# Patient Record
Sex: MALE | Race: WHITE | Employment: FULL TIME | URBAN - METROPOLITAN AREA
[De-identification: names, ages, dates, MRNs, and addresses within clinical notes are randomized per-mention and may not be internally consistent; named-entity substitution may affect disease eponyms.]

---

## 2017-11-30 LAB
CHOLESTEROL, TOTAL: 192 MG/DL
CHOLESTEROL/HDL RATIO: 4.3
HDLC SERPL-MCNC: 45 MG/DL (ref 35–70)
HIV AG/AB: NORMAL
LDL CHOLESTEROL CALCULATED: 124 MG/DL (ref 0–160)
TRIGL SERPL-MCNC: 115 MG/DL
TSH SERPL DL<=0.05 MIU/L-ACNC: 1.04 UIU/ML
VLDLC SERPL CALC-MCNC: 23 MG/DL

## 2018-08-22 ENCOUNTER — OFFICE VISIT (OUTPATIENT)
Dept: FAMILY MEDICINE CLINIC | Age: 33
End: 2018-08-22

## 2018-08-22 VITALS
SYSTOLIC BLOOD PRESSURE: 123 MMHG | TEMPERATURE: 96.7 F | OXYGEN SATURATION: 100 % | RESPIRATION RATE: 12 BRPM | HEIGHT: 75 IN | HEART RATE: 46 BPM | DIASTOLIC BLOOD PRESSURE: 79 MMHG | BODY MASS INDEX: 25.29 KG/M2 | WEIGHT: 203.4 LBS

## 2018-08-22 DIAGNOSIS — R22.1 LUMP IN NECK: ICD-10-CM

## 2018-08-22 DIAGNOSIS — K62.5 BRBPR (BRIGHT RED BLOOD PER RECTUM): ICD-10-CM

## 2018-08-22 DIAGNOSIS — I73.00 RAYNAUD'S PHENOMENON WITHOUT GANGRENE: ICD-10-CM

## 2018-08-22 DIAGNOSIS — L64.9 MALE PATTERN ALOPECIA: ICD-10-CM

## 2018-08-22 DIAGNOSIS — H69.83 ETD (EUSTACHIAN TUBE DYSFUNCTION), BILATERAL: ICD-10-CM

## 2018-08-22 DIAGNOSIS — J30.2 SEASONAL ALLERGIC RHINITIS, UNSPECIFIED TRIGGER: Primary | ICD-10-CM

## 2018-08-22 DIAGNOSIS — L72.9 CYST OF SKIN: ICD-10-CM

## 2018-08-22 DIAGNOSIS — J34.2 NASAL SEPTAL DEVIATION: ICD-10-CM

## 2018-08-22 PROCEDURE — 99204 OFFICE O/P NEW MOD 45 MIN: CPT | Performed by: FAMILY MEDICINE

## 2018-08-22 RX ORDER — SULFAMETHOXAZOLE AND TRIMETHOPRIM 800; 160 MG/1; MG/1
1 TABLET ORAL 2 TIMES DAILY
Qty: 14 TABLET | Refills: 0 | Status: SHIPPED | OUTPATIENT
Start: 2018-08-22 | End: 2018-08-29

## 2018-08-22 RX ORDER — CETIRIZINE HYDROCHLORIDE 10 MG/1
10 TABLET ORAL DAILY
COMMUNITY
End: 2020-11-24

## 2018-08-22 RX ORDER — FINASTERIDE 1 MG/1
1 TABLET, FILM COATED ORAL DAILY
COMMUNITY
End: 2020-11-24

## 2018-08-22 RX ORDER — TRETINOIN 1 MG/G
CREAM TOPICAL NIGHTLY
COMMUNITY
End: 2022-01-12

## 2018-08-22 ASSESSMENT — PATIENT HEALTH QUESTIONNAIRE - PHQ9
SUM OF ALL RESPONSES TO PHQ QUESTIONS 1-9: 0
SUM OF ALL RESPONSES TO PHQ9 QUESTIONS 1 & 2: 0
SUM OF ALL RESPONSES TO PHQ QUESTIONS 1-9: 0
2. FEELING DOWN, DEPRESSED OR HOPELESS: 0
1. LITTLE INTEREST OR PLEASURE IN DOING THINGS: 0

## 2018-08-22 NOTE — PROGRESS NOTES
Here for well checkup, physical.  Pt was living in [place] for 10 years, in [facility] and enjoyed being there, but is recently moving back to [place]. Pt is going to be looking at downtown living. 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 Pt is happy to be back in [place], but is happy to be back. Pt does have some issues of chroinc allergy sx, pt states related to sx does get a chronic issues of some R ear and head discomfort. Pt did see ENT and had full evaluation including imaging. Was dx with septal deviation, allergies, and eustachian tube dysfunction. They did discuss surgery in the past.  Pt does have known allergy history. Pt does smoke cigars rarely, never cigarettes. Pt is on finasteride for hair loss, has been on chroincally for years. Pt does take the 1mg dose and tolerates well. Pt has found it to be very helpful. Pt states that he has noted a small lump under chin, present for about 5d. Pt does not have any discomfort, felt initially was an ingrown hair. Did play with it and felt some sensitivity and has calmed down. About an olive in size. No discharge    On and off for years, has had some intermittent sx of blood in bowels. Pt gets sx intermittently and does appear to be related to hemorrhoids. Sx occur every few months, last visit was 3 months ago. No nausea/vomiting. Bowels are normal, schedule is normal and does not have any hard bowels. Does do well except with travelling. Pt feels he has a circulation issue. Pt states hand and feet get cold, do feel that he ranges on the colder side. Sx have been chronic.           Except as noted in the history of present illness as above, the review of systems is negative for the following:    General ROS: negative  Psychological ROS: negative  Allergy and Immunology ROS: negative  Hematological and Lymphatic ROS: negative  Respiratory ROS: no cough, shortness of breath, or wheezing  Cardiovascular ROS: no chest pain or dyspnea on exertion  Gastrointestinal ROS: no abdominal pain, change in bowel habits, or black or bloody stools  Genito-Urinary ROS: no dysuria, trouble voiding, or hematuria  Musculoskeletal ROS: negative  Dermatological ROS: negative      Past medical, surgical, and social history reviewed and updated. Medications and allergies reviewed and updated        O: /79   Pulse (!) 46   Temp 96.7 °F (35.9 °C) (Oral)   Resp 12   Ht 6' 2.5\" (1.892 m)   Wt 203 lb 6.4 oz (92.3 kg)   SpO2 100%   BMI 25.77 kg/m²   GEN: No acute distress, cooperative, well nourished, alert. HEENT: PEERLA, EOMI , normocephalic/atraumatic, nares and oropharynx clear. Mucus membranes normal, Tympanic membranes clear bilaterally. Firm nodule under chin without tender to palpation, 1.5cm in size, mobile, no active discharge or erythema  Neck: soft, supple, no thyromegaly, mass, no Lymphadenopathy  CV: Regular rate and rhythm, no murmur, rubs, gallops. No edema. Resp: Clear to auscultation bilaterally good air entry bilaterally  No crackles, wheeze. Breathing comfortably. Abd: soft, notnender. normoactive bowels sounds.  No hepatosplenomegaly   Ext: 2+ pulses bilateral upper extremities, bilateral lower extremities        Current Outpatient Prescriptions   Medication Sig Dispense Refill    finasteride (PROPECIA) 1 MG tablet Take 1 mg by mouth daily      cetirizine (ZYRTEC ALLERGY) 10 MG tablet Take 10 mg by mouth daily      Multiple Vitamins-Minerals (MULTIVITAMIN ADULT PO) Take 1 tablet by mouth daily      Loratadine-Pseudoephedrine (ALLERGY RELIEF/NASAL DECONGEST PO) Take 1 spray by mouth daily as needed      Pseudoephedrine HCl (SUDAFED 24 HOUR PO) Take 1 tablet by mouth daily as needed      sulfamethoxazole-trimethoprim (BACTRIM DS) 800-160 MG per tablet Take 1 tablet by mouth 2 times daily for 7 days 14 tablet 0  tretinoin (RETIN-A) 0.01 % gel Apply topically nightly Apply topically nightly. No current facility-administered medications for this visit. ASSESSMENT / PLAN:    1. Seasonal allergic rhinitis, unspecified trigger  Moderate persistent sx  Cont symptomatic tx and monitor  Refer allergy for formal testing  Management pending results. - External Referral To Allergy    2. Raynaud's phenomenon without gangrene  The patient is reassured that these symptoms do not appear to represent a serious or threatening condition. Monitor with symptomatic tx  Handout given  Consider CCB therapy    3. Male pattern alopecia  Cont propecia    4. Cyst of skin  Under chin  tx with bactrim DS BID x 7d and refer ENT for eval    5. BRBPR (bright red blood per rectum)  Asymptomatic, suspect ext hemorrhoids  F/u increased sx and will consider referral to GI for eval    6. Nasal septal deviation  Refer ENT for eval  - Rina Mariscal MD    7. ETD (Eustachian tube dysfunction), bilateral  Cont symptomatic tx, monitor with f/u with ENT and allergy  - Rina Mariscal MD           Follow-up appointment:   3-4mos/prn    Discussed use, benefit, and side effects of all prescribed medications. Barriers to medication compliance addressed. All patient questions answered. Pt voiced understanding. When applicable, patient's outside records were reviewed through [facility]. The patient has signed appropriate paperworks/consents. Dragon dictation software was used for parts of this progress note. All attempts were made to correct any errors and ensure accuracy.

## 2018-09-06 ENCOUNTER — TELEPHONE (OUTPATIENT)
Dept: FAMILY MEDICINE CLINIC | Age: 33
End: 2018-09-06

## 2018-10-11 ENCOUNTER — OFFICE VISIT (OUTPATIENT)
Dept: FAMILY MEDICINE CLINIC | Age: 33
End: 2018-10-11
Payer: COMMERCIAL

## 2018-10-11 VITALS
BODY MASS INDEX: 25.21 KG/M2 | DIASTOLIC BLOOD PRESSURE: 75 MMHG | HEIGHT: 76 IN | TEMPERATURE: 97.5 F | SYSTOLIC BLOOD PRESSURE: 127 MMHG | OXYGEN SATURATION: 100 % | WEIGHT: 207 LBS | HEART RATE: 53 BPM

## 2018-10-11 DIAGNOSIS — L98.9 LEG SKIN LESION, RIGHT: ICD-10-CM

## 2018-10-11 DIAGNOSIS — Z00.00 ROUTINE GENERAL MEDICAL EXAMINATION AT A HEALTH CARE FACILITY: Primary | ICD-10-CM

## 2018-10-11 DIAGNOSIS — R53.83 FATIGUE, UNSPECIFIED TYPE: ICD-10-CM

## 2018-10-11 DIAGNOSIS — Z23 NEEDS FLU SHOT: ICD-10-CM

## 2018-10-11 DIAGNOSIS — K62.5 BRBPR (BRIGHT RED BLOOD PER RECTUM): ICD-10-CM

## 2018-10-11 DIAGNOSIS — K64.8 INTERNAL HEMORRHOID, BLEEDING: ICD-10-CM

## 2018-10-11 DIAGNOSIS — R22.1 LUMP IN NECK: ICD-10-CM

## 2018-10-11 DIAGNOSIS — J34.2 DEVIATED SEPTUM: ICD-10-CM

## 2018-10-11 PROCEDURE — 90471 IMMUNIZATION ADMIN: CPT | Performed by: FAMILY MEDICINE

## 2018-10-11 PROCEDURE — 90686 IIV4 VACC NO PRSV 0.5 ML IM: CPT | Performed by: FAMILY MEDICINE

## 2018-10-11 PROCEDURE — 99395 PREV VISIT EST AGE 18-39: CPT | Performed by: FAMILY MEDICINE

## 2018-10-11 NOTE — PROGRESS NOTES
tenderness. Oropharynx - Lips, mucosa, and tongue normal. Teeth and gums normal.   Neck - Neck supple. No adenopathy. Thyroid symmetric, normal size, no carotid bruit bilaterally. Small 2-3mm nodule under center of chin w/o mass, tender to palpation   Back - Back symmetric, no curvature. Range of motion normal. No Costovertebral angle tenderness. Lungs - Percussion normal. Good diaphragmatic excursion. Lungs clear without wheeze, rales, crackles  Heart - Regular rate and rhythm, with no rub, murmur or gallop noted. Abdomen - Abdomen soft, non-tender. Bowel sounds normal. No masses, tenderness or organomegaly  Testes are normal without masses, no hernias noted. Phallus normal.   Rectal: +L sided internal hemorrhoid w/o mass, bleeding. No discomfort. No active bleeding or discharge  Extremities - Extremities normal. No deformities, edema, or skin discoloration  Musculoskeletal - Spine ROM normal. Muscular strength intact. Peripheral pulses - radial=4/4,, femoral=4/4, popliteal=4/4, dorsalis pedis=4/4,  Neuro - Gait normal. Reflexes normal and symmetric. Sensation grossly normal.  No focal weakness  Psych - euthymic, no suicidal thoughts or ideation, mood stable. Current Outpatient Prescriptions   Medication Sig Dispense Refill    finasteride (PROPECIA) 1 MG tablet Take 1 mg by mouth daily      cetirizine (ZYRTEC ALLERGY) 10 MG tablet Take 10 mg by mouth daily      Multiple Vitamins-Minerals (MULTIVITAMIN ADULT PO) Take 1 tablet by mouth daily      Loratadine-Pseudoephedrine (ALLERGY RELIEF/NASAL DECONGEST PO) Take 1 spray by mouth daily as needed      Pseudoephedrine HCl (SUDAFED 24 HOUR PO) Take 1 tablet by mouth daily as needed      tretinoin (RETIN-A) 0.1 % cream Apply topically nightly Apply topically nightly. No current facility-administered medications for this visit. ASSESSMENT / PLAN:    1.  Routine general medical examination at a health care facility  No focal abnormalities on exam  Anticipatory guidance discussed. Check fasting bloodwork for:  - CBC; Future  - Comprehensive Metabolic Panel; Future  - Lipid Panel; Future  - TSH without Reflex; Future  - Vitamin B12 & Folate; Future  - Vitamin D 25 Hydroxy; Future  - Testosterone; Future    2. Lump in neck  Decrease in size from prior eval s/p abx  Monitor with symptomatic tx and refer ENT for eval  Discussed CT scan  Suspect benign cyst  - Mariella Bustamante DO, Otolaryngology, [facility]    3. BRBPR (bright red blood per rectum)  D/t internal hemorrhoid  Symptomatic tx discussed  Will f/u increased sx and consider GI referral  Check CBC    4. Leg skin lesion, right  Benign-apperaing  ABC's of skin lesions discussed, follow up for increase in size, change color. Has appt 3-4wks w/ derm  If increased in size can f/u here for eval and biopsy    5. Fatigue, unspecified type  Check bloodwork as above    6. Internal hemorrhoid, bleeding  As above    7. Deviated septum  Refer ENT for eval  - Eladio Morales DO, Otolaryngology, [facility]    8. Needs flu shot  Given today           Follow-up appointment:   Pending bloodwork results/prn    Discussed use, benefit, and side effects of all prescribed medications. Barriers to medication compliance addressed. All patient questions answered. Pt voiced understanding. When applicable, patient's outside records were reviewed through [facility]. The patient has signed appropriate paperworks/consents. Dragon dictation software was used for parts of this progress note. All attempts were made to correct any errors and ensure accuracy.

## 2018-11-19 ENCOUNTER — OFFICE VISIT (OUTPATIENT)
Dept: DERMATOLOGY | Age: 33
End: 2018-11-19
Payer: COMMERCIAL

## 2018-11-19 DIAGNOSIS — D48.5 NEOPLASM OF UNCERTAIN BEHAVIOR OF SKIN: ICD-10-CM

## 2018-11-19 DIAGNOSIS — L70.9 ADULT ACNE: ICD-10-CM

## 2018-11-19 DIAGNOSIS — B07.0 PLANTAR WART, RIGHT FOOT: ICD-10-CM

## 2018-11-19 DIAGNOSIS — D22.9 MULTIPLE NEVI: Primary | ICD-10-CM

## 2018-11-19 PROCEDURE — 11100 PR BIOPSY OF SKIN LESION: CPT | Performed by: DERMATOLOGY

## 2018-11-19 PROCEDURE — 99202 OFFICE O/P NEW SF 15 MIN: CPT | Performed by: DERMATOLOGY

## 2018-11-26 LAB — DERMATOLOGY PATHOLOGY REPORT: ABNORMAL

## 2018-11-27 ENCOUNTER — OFFICE VISIT (OUTPATIENT)
Dept: DERMATOLOGY | Age: 33
End: 2018-11-27
Payer: COMMERCIAL

## 2018-11-27 DIAGNOSIS — C44.712 BASAL CELL CARCINOMA (BCC) OF RIGHT LOWER LEG: Primary | ICD-10-CM

## 2018-11-27 PROCEDURE — 17262 DSTRJ MAL LES T/A/L 1.1-2.0: CPT | Performed by: DERMATOLOGY

## 2018-11-27 NOTE — PATIENT INSTRUCTIONS
Patient Education        Learning About Basal Cell Skin Cancer  Your Care Instructions    Basal cell skin cancer (carcinoma) is a type of skin cancer. It most often appears on areas of the body that have been exposed to the sun. These areas include the head, face, neck, back, chest, or shoulders. The nose is the most common site. This cancer grows slowly and does not usually spread, or metastasize, to other parts of the body. It is almost always cured when it is found early and treated. This skin cancer is usually caused by too much sun. Using tanning beds or sunlamps can also cause it. What are the symptoms? Signs of basal cell carcinoma include:  · Any firm, pearly bump with tiny blood vessels that look spidery. · Any red, tender, flat spot that bleeds easily. · Any small, fleshy bump with a smooth, pearly appearance. It may have a sunken center. · Any smooth, shiny bump that may look like a mole or cyst.  · Any patch of skin, especially on the face, that looks like a scar and is firm to the touch. · Any bump that itches, bleeds, crusts over, and then repeats the cycle and has not healed in a few weeks. · Any change in the size, shape, or color of a mole or a skin growth. How is it treated? Your doctor will want to remove all of the cancer. There are several ways to remove it. It depends on how big it is, where it is on your body, and your age and overall health. Treatment options include:  · Surgery to cut out the cancer. · Mohs micrographic surgery. This surgery removes the skin cancer one layer at a time, checking each layer for cancer cells right after it is removed. · Curettage and electrosurgery. Curettage uses a spoon-shaped instrument (curette) to scrape off the skin cancer, and electrosurgery controls the bleeding and destroys any remaining cancer cells. · Cryosurgery. Cryosurgery destroys the skin cancer by freezing it with liquid nitrogen. · Radiation therapy.  Radiation therapy uses X-rays or other types of radiation to kill cancer cells. It may be done if surgery isn't an option. Other treatment options include chemotherapy cream and photodynamic therapy. If your doctor removes the cancer, he or she will send it to a lab. The lab makes sure it is a basal cell cancer and that it all was removed. If cancer is still there, you may need more treatment. How can you prevent it? · Always wear a wide-brimmed hat and long sleeves and pants when you are outdoors. · Avoid the sun between 10 a.m. and 4 p.m., which is the peak time for UV rays. · Always wear sunscreen on exposed skin. Make sure to use a broad-spectrum sunscreen that has a sun protection factor (SPF) of 30 or higher. Use it every day, even when it is cloudy. · Do not use tanning booths or sunlamps. · Use lip balm or cream that has sun protection factor (SPF) to protect your lips from getting sunburned. · Wear sunglasses that block UV rays. When should you call for help? Watch closely for changes in your health, and be sure to contact your doctor if:  · You see a change in your skin, such as a growth or mole that:  ? Grows bigger. This may happen slowly. ? Changes color. ? Changes shape. ? Starts to bleed easily. Follow-up care is a key part of your treatment and safety. Be sure to make and go to all appointments, and call your doctor if you are having problems. It's also a good idea to know your test results and keep a list of the medicines you take. Where can you learn more? Go to [email or web address] and sign in to your [employer] account. Enter [phone] in the [facility] box to learn more about \"Learning About Basal Cell Skin Cancer. \"     If you do not have an account, please click on the \"Sign Up Now\" link. Current as of: March 28, 2018  Content Version: 11.8  © 3707-4945 Healthwise, Incorporated. Care instructions adapted under license by [facility] ([facility]).  If you have questions about a medical

## 2018-12-21 ENCOUNTER — TELEPHONE (OUTPATIENT)
Dept: DERMATOLOGY | Age: 33
End: 2018-12-21

## 2018-12-24 ENCOUNTER — TELEPHONE (OUTPATIENT)
Dept: FAMILY MEDICINE CLINIC | Age: 33
End: 2018-12-24

## 2018-12-24 DIAGNOSIS — M54.50 ACUTE BILATERAL LOW BACK PAIN WITHOUT SCIATICA: Primary | ICD-10-CM

## 2019-01-15 ENCOUNTER — TELEPHONE (OUTPATIENT)
Dept: DERMATOLOGY | Age: 34
End: 2019-01-15

## 2019-05-30 ENCOUNTER — OFFICE VISIT (OUTPATIENT)
Dept: DERMATOLOGY | Age: 34
End: 2019-05-30
Payer: COMMERCIAL

## 2019-05-30 DIAGNOSIS — B07.0 PLANTAR WART, RIGHT FOOT: Primary | ICD-10-CM

## 2019-05-30 DIAGNOSIS — L70.9 ADULT ACNE: ICD-10-CM

## 2019-05-30 DIAGNOSIS — Z85.828 HISTORY OF BASAL CELL CARCINOMA (BCC): ICD-10-CM

## 2019-05-30 DIAGNOSIS — D23.62: ICD-10-CM

## 2019-05-30 PROCEDURE — 99213 OFFICE O/P EST LOW 20 MIN: CPT | Performed by: DERMATOLOGY

## 2019-05-30 NOTE — PROGRESS NOTES
[facility] Dermat[facility]  Yovani Lutz MD  [phone]      Ronan Vazquez  1985    28 y.o. male     Date of Visit: 2019    Chief Complaint: skin lesions    History of Present Illness:    1. Follow up for plantar warts on the right foot-has few new lesions. None are painful or bothersome. Has used Tessie & Geovanna with some improvement. 2.  He also complains of a newly noted asymptomatic lesion on the left forearm. 3.  Follow-up for adult acne of the face and buttocks. Doing well with use of tretinoin 0.1% cream nightly. 4.  He has a recent history of a superficial basal cell carcinoma on the posterior aspect the right lower leg-treated with curettage on 2018. Wears sunscreen regularly. Paternal grandfather had melanoma ( from it in his 45s). Review of Systems:  Skin: No new or changing moles. Past Medical History, Family History, Surgical History, Medications and Allergies reviewed. Past Medical History:   Diagnosis Date    Allergic rhinitis      Past Surgical History:   Procedure Laterality Date    VARICOCELECTOMY         Allergies   Allergen Reactions    Amoxicillin Rash     Outpatient Medications Marked as Taking for the 19 encounter (Office Visit) with Mariana Lennon MD   Medication Sig Dispense Refill    finasteride (PROPECIA) 1 MG tablet Take 1 mg by mouth daily      cetirizine (ZYRTEC ALLERGY) 10 MG tablet Take 10 mg by mouth daily      Multiple Vitamins-Minerals (MULTIVITAMIN ADULT PO) Take 1 tablet by mouth daily      Loratadine-Pseudoephedrine (ALLERGY RELIEF/NASAL DECONGEST PO) Take 1 spray by mouth daily as needed      Pseudoephedrine HCl (SUDAFED 24 HOUR PO) Take 1 tablet by mouth daily as needed      tretinoin (RETIN-A) 0.1 % cream Apply topically nightly Apply topically nightly.            Physical Examination       The following were examined and determined to be normal: Psych/Neuro, Scalp/hair, Head/face, Conjunctivae/eyelids, Gums/teeth/lips, Neck, Breast/axilla/chest, Abdomen, Back, RUE, LUE, LLE and Nails/digits. The following were examined and determined to be abnormal: RLE and Genitalia/groin/buttocks. Well appearing. 1. Right medial forefoot - 3 small round verrucous pink papules. 2.  Left forearm - small round indurated pink tan papule. 3.  Buttocks with few erythematous pustules. Face clear. 4.  Right lower posterior leg - oval shaped pink brown scar. Assessment and Plan     1. Plantar warts, right foot - small and asympatomatic    WartStick daily until improved. 2. Dermatofibroma of left forearm     Reassurance. 3. Adult acne - under good control    Continue use of tretinoin 0.1% cream.     4. History of basal cell carcinoma (BCC) - clear    Sun protective behaviors and self skin examinations were encouraged. Call for any new or concerning lesions. Return in about 6 months (around 11/30/2019).

## 2019-11-07 ENCOUNTER — TELEPHONE (OUTPATIENT)
Dept: DERMATOLOGY | Age: 34
End: 2019-11-07

## 2019-12-09 ENCOUNTER — TELEPHONE (OUTPATIENT)
Dept: DERMATOLOGY | Age: 34
End: 2019-12-09

## 2020-01-28 ENCOUNTER — TELEPHONE (OUTPATIENT)
Dept: DERMATOLOGY | Age: 35
End: 2020-01-28

## 2020-02-18 ENCOUNTER — OFFICE VISIT (OUTPATIENT)
Dept: DERMATOLOGY | Age: 35
End: 2020-02-18
Payer: COMMERCIAL

## 2020-02-18 PROCEDURE — 99213 OFFICE O/P EST LOW 20 MIN: CPT | Performed by: DERMATOLOGY

## 2020-02-18 PROCEDURE — 17110 DESTRUCTION B9 LES UP TO 14: CPT | Performed by: DERMATOLOGY

## 2020-02-18 RX ORDER — ADAPALENE AND BENZOYL PEROXIDE 3; 25 MG/G; MG/G
GEL TOPICAL
Qty: 45 G | Refills: 3 | Status: SHIPPED | OUTPATIENT
Start: 2020-02-18 | End: 2020-08-04 | Stop reason: SDUPTHER

## 2020-02-18 RX ORDER — TRIAMCINOLONE ACETONIDE 1 MG/G
CREAM TOPICAL
Qty: 80 G | Refills: 1 | Status: SHIPPED | OUTPATIENT
Start: 2020-02-18 | End: 2022-01-12

## 2020-02-18 NOTE — TELEPHONE ENCOUNTER
Patient returned call and stating there should have been paperwork sent for a prior auth for his medicarion,    He can be reached at [phone].   Thanks

## 2020-02-27 ENCOUNTER — TELEPHONE (OUTPATIENT)
Dept: DERMATOLOGY | Age: 35
End: 2020-02-27

## 2020-02-27 NOTE — TELEPHONE ENCOUNTER
Pt lvm c/b #[phone]  Pt stated  Pt stated clarke called 2 scripts in for him he was able to fill one script but he wants to speak with someone to discuss results about the price of the script.

## 2020-02-28 NOTE — TELEPHONE ENCOUNTER
Dr. Gina Salomon patient    Patient called back and Adapalene-Benzoyl Peroxide (EPIDUO FORTE) 0.3-2.5 % GEL is still expensive at $300. An alternative will be ok but would like to discuss.     Call back # [phone]

## 2020-02-28 NOTE — TELEPHONE ENCOUNTER
Called and left message for patient to call back office. Please ask patient if it was the [employer] that was expensive. If he would like Dr Crescencio Ocampo can send in a cheaper alternative.

## 2020-03-02 NOTE — TELEPHONE ENCOUNTER
Faxed Silvano coupon to [employer] which brought price down to $75 (called to confirm it was recieved). Left message informing patient of this.

## 2020-06-30 ENCOUNTER — OFFICE VISIT (OUTPATIENT)
Dept: PRIMARY CARE CLINIC | Age: 35
End: 2020-06-30

## 2020-07-02 LAB
SARS-COV-2: NOT DETECTED
SOURCE: NORMAL

## 2020-07-14 ENCOUNTER — OFFICE VISIT (OUTPATIENT)
Dept: PRIMARY CARE CLINIC | Age: 35
End: 2020-07-14
Payer: COMMERCIAL

## 2020-07-14 PROCEDURE — G8428 CUR MEDS NOT DOCUMENT: HCPCS | Performed by: NURSE PRACTITIONER

## 2020-07-14 PROCEDURE — 99211 OFF/OP EST MAY X REQ PHY/QHP: CPT | Performed by: NURSE PRACTITIONER

## 2020-07-14 PROCEDURE — G8421 BMI NOT CALCULATED: HCPCS | Performed by: NURSE PRACTITIONER

## 2020-07-14 NOTE — PROGRESS NOTES
Macho Burton received a viral test for COVID-19. They were educated on isolation and quarantine as appropriate. For any symptoms, they were directed to seek care from their PCP, given contact information to establish with a doctor, directed to an urgent care or the emergency room.

## 2020-07-19 LAB
SARS-COV-2: NOT DETECTED
SOURCE: NORMAL

## 2020-08-04 ENCOUNTER — OFFICE VISIT (OUTPATIENT)
Dept: DERMATOLOGY | Age: 35
End: 2020-08-04
Payer: COMMERCIAL

## 2020-08-04 VITALS — TEMPERATURE: 97.5 F

## 2020-08-04 PROCEDURE — G8427 DOCREV CUR MEDS BY ELIG CLIN: HCPCS | Performed by: DERMATOLOGY

## 2020-08-04 PROCEDURE — 4004F PT TOBACCO SCREEN RCVD TLK: CPT | Performed by: DERMATOLOGY

## 2020-08-04 PROCEDURE — G8421 BMI NOT CALCULATED: HCPCS | Performed by: DERMATOLOGY

## 2020-08-04 PROCEDURE — 99213 OFFICE O/P EST LOW 20 MIN: CPT | Performed by: DERMATOLOGY

## 2020-08-04 RX ORDER — ADAPALENE AND BENZOYL PEROXIDE 3; 25 MG/G; MG/G
GEL TOPICAL
Qty: 45 G | Refills: 3 | Status: SHIPPED | OUTPATIENT
Start: 2020-08-04 | End: 2021-07-21 | Stop reason: SDUPTHER

## 2020-08-04 NOTE — PROGRESS NOTES
[facility]at[facility]  Chel Mahmood  1985    27 y.o. male     Date of Visit: 2020    Chief Complaint: skin moles    History of Present Illness:    1. He presents today for evaluation of multiple nevi on the trunk and extremities. Not aware of any changes in size, color or shape. 2.  He has a history of a superficial basal cell carcinoma on the posterior aspect the right lower leg-treated with curettage on 2018. Denies any signs of recurrence. 3.  F/u for adult acne - still comes and goes but improves with Epiduo Forte gel. Wears sunscreen regularly. Paternal grandfather had melanoma ( from it in his 45s).        Review of Systems:  Skin: no rash. Past Medical History, Family History, Surgical History, Medications and Allergies reviewed. Past Medical History:   Diagnosis Date    Allergic rhinitis      Past Surgical History:   Procedure Laterality Date    VARICOCELECTOMY         Allergies   Allergen Reactions    Amoxicillin Rash     Outpatient Medications Marked as Taking for the 20 encounter (Office Visit) with Tere Campbell MD   Medication Sig Dispense Refill    Adapalene-Benzoyl Peroxide (EPIDUO FORTE) 0.3-2.5 % GEL Apply to the face daily for acne. 45 g 3    triamcinolone (KENALOG) 0.1 % cream Apply to affected area twice daily for up to 2 weeks or until improved. 80 g 1    finasteride (PROPECIA) 1 MG tablet Take 1 mg by mouth daily      cetirizine (ZYRTEC ALLERGY) 10 MG tablet Take 10 mg by mouth daily      Multiple Vitamins-Minerals (MULTIVITAMIN ADULT PO) Take 1 tablet by mouth daily      Loratadine-Pseudoephedrine (ALLERGY RELIEF/NASAL DECONGEST PO) Take 1 spray by mouth daily as needed      Pseudoephedrine HCl (SUDAFED 24 HOUR PO) Take 1 tablet by mouth daily as needed      tretinoin (RETIN-A) 0.1 % cream Apply topically nightly Apply topically nightly.            Physical Examination       The following were examined and determined to be normal: Psych/Neuro, Scalp/hair, Head/face, Conjunctivae/eyelids, Gums/teeth/lips, Neck, Breast/axilla/chest, Abdomen, Back, RUE, LUE, RLE, LLE and Nails/digits. The following were examined and determined to be abnormal: None. Well appearing. 1.  Trunk and extremities with multiple well defined round to oval smooth brown macules and papules. 2.  Right posterior lower leg - hypopigmented smooth scar. 3.  Clear. Assessment and Plan     1. Multiple nevi - benign appearing    Sun protective behaviors and self skin examinations were encouraged. Call for any new or concerning lesions. 2. History of basal cell carcinoma (BCC) - clear    Sun protective behaviors and self skin examinations were encouraged. Call for any new or concerning lesions. 3. Adult acne - under good control    Continue Epiduo Forte gel daily as needed. Return in about 6 months (around 2/4/2021).

## 2020-11-11 ENCOUNTER — TELEPHONE (OUTPATIENT)
Dept: FAMILY MEDICINE CLINIC | Age: 35
End: 2020-11-11

## 2020-11-11 DIAGNOSIS — Z00.00 ROUTINE GENERAL MEDICAL EXAMINATION AT A HEALTH CARE FACILITY: Primary | ICD-10-CM

## 2020-11-12 ENCOUNTER — TELEPHONE (OUTPATIENT)
Dept: FAMILY MEDICINE CLINIC | Age: 35
End: 2020-11-12

## 2020-11-12 NOTE — TELEPHONE ENCOUNTER
Patient would also like lab orders for blood type and COVID antibody testing. Patient questioning why last time (2018) he was also tested for Vitamin D25, Vitamin B12 and testosterone but not this time.    Please call him at # [phone]

## 2020-11-24 ENCOUNTER — OFFICE VISIT (OUTPATIENT)
Dept: FAMILY MEDICINE CLINIC | Age: 35
End: 2020-11-24
Payer: COMMERCIAL

## 2020-11-24 ENCOUNTER — PATIENT MESSAGE (OUTPATIENT)
Dept: FAMILY MEDICINE CLINIC | Age: 35
End: 2020-11-24

## 2020-11-24 PROCEDURE — G8482 FLU IMMUNIZE ORDER/ADMIN: HCPCS | Performed by: FAMILY MEDICINE

## 2020-11-24 PROCEDURE — 99395 PREV VISIT EST AGE 18-39: CPT | Performed by: FAMILY MEDICINE

## 2020-11-24 ASSESSMENT — PATIENT HEALTH QUESTIONNAIRE - PHQ9
1. LITTLE INTEREST OR PLEASURE IN DOING THINGS: 0
SUM OF ALL RESPONSES TO PHQ9 QUESTIONS 1 & 2: 0
SUM OF ALL RESPONSES TO PHQ QUESTIONS 1-9: 0
2. FEELING DOWN, DEPRESSED OR HOPELESS: 0

## 2020-11-24 NOTE — PROGRESS NOTES
distress  Skin - Skin color, texture, turgor normal. No rashes or lesions. No suspicious findings  Head - Normocephalic. No masses, lesions, tenderness or abnormalities  Eyes - conjunctivae/corneas clear. Pupils equal and reactive to light and accomodation, extraocular muscles intact. Ears - External ears normal. Canals clear. Tympanic membranes normal bilaterally. Nose/Sinuses - Nares normal. Septum midline. Mucosa normal. No drainage or sinus tenderness. Oropharynx - Lips, mucosa, and tongue normal. Teeth and gums normal.   Neck - Neck supple. No adenopathy. Thyroid symmetric, normal size, no carotid bruit bilaterally  Back - Back symmetric, no curvature. Range of motion normal. No Costovertebral angle tenderness. Lungs - Percussion normal. Good diaphragmatic excursion. Lungs clear without wheeze, rales, crackles  Heart - Regular rate and rhythm, with no rub, murmur or gallop noted. Abdomen - Abdomen soft, non-tender. Bowel sounds normal. No masses, tenderness or organomegaly  Extremities - Extremities normal. No deformities, edema, or skin discoloration  Musculoskeletal - Spine ROM normal. Muscular strength intact. Peripheral pulses - radial=4/4,, femoral=4/4, popliteal=4/4, dorsalis pedis=4/4,  Neuro - Gait normal. Reflexes normal and symmetric. Sensation grossly normal.  No focal weakness  Psych - euthymic, no suicidal thoughts or ideation, mood stable. Current Outpatient Medications   Medication Sig Dispense Refill    Adapalene-Benzoyl Peroxide (EPIDUO FORTE) 0.3-2.5 % GEL Apply to the face daily for acne.  45 g 3    finasteride (PROPECIA) 1 MG tablet Take 1 mg by mouth daily      cetirizine (ZYRTEC ALLERGY) 10 MG tablet Take 10 mg by mouth daily      Multiple Vitamins-Minerals (MULTIVITAMIN ADULT PO) Take 1 tablet by mouth daily      Loratadine-Pseudoephedrine (ALLERGY RELIEF/NASAL DECONGEST PO) Take 1 spray by mouth daily as needed      Pseudoephedrine HCl (SUDAFED 24 HOUR PO) Take 1 tablet by mouth daily as needed      tretinoin (RETIN-A) 0.1 % cream Apply topically nightly Apply topically nightly.  triamcinolone (KENALOG) 0.1 % cream Apply to affected area twice daily for up to 2 weeks or until improved. (Patient not taking: Reported on 11/24/2020) 80 g 1     No current facility-administered medications for this visit. Immunization History   Administered Date(s) Administered    Influenza, MDCK Quadv, IM, PF (Flucelvax 4 yrs and older) 10/31/2019, 10/06/2020    Influenza, Quadv, IM, PF (6 mo and older Fluzone, Flulaval, Fluarix, and 3 yrs and older Afluria) 10/11/2018    Tdap (Boostrix, Adacel) 01/10/2017         ASSESSMENT / PLAN:    1. Routine general medical examination at a health care facility  No focal abnormalities on exam  Anticipatory guidance discussed. Orders in system for bloodwork     2. COVID-19 virus infection  Resolved  Currently asymptomatic  Check covid ab  monitor    3. All rhinitis  Cont symptomatic/supportive therapy and will monitor  Off meds at this time  Cont zyrtec prn  Consider allergist referral    4. chronic wrist pain  Refer to dr. Ellis Segura for eval    5. Deviated septum  Cont supportive therapy, and consider ENT for increased sx severity  Consider allergist referral  Follow-up appointment:   Pending bloodwork results/1 year/prn        Discussed use, benefit, and side effects of all prescribed medications. Barriers to medication compliance addressed. All patient questions answered. Pt voiced understanding. When applicable, patient's outside records were reviewed through [facility]. The patient has signed appropriate paperworks/consents.

## 2020-11-24 NOTE — TELEPHONE ENCOUNTER
From: Corinne Riser  To: Any Diane MD  Sent: 11/24/2020 11:52 AM EST  Subject: Visit Danielle Patel,  Thanks again for today's visit. Here's the fertility doc background I promised:  - Intro appointment Tues 12/8 with Dr. Allegra Miles at [facility] for [address]. Also. ..  - Where can I access the results from today's customary tests (e.g., blood pressure, oxygen, etc.)? I wasn't provided the usual paper recap upon leaving.   Sincerely,  Parviz Herrera

## 2020-11-25 NOTE — TELEPHONE ENCOUNTER
Patient's message     I'd also like to revise the existing \"order\" for my bloodwork to exclude Antibody testing since my insurance likely doesn't cover. Please confirm as soon as you're able. I'll hold off on going over to the lab until then.

## 2020-11-30 VITALS
TEMPERATURE: 97.2 F | HEART RATE: 62 BPM | BODY MASS INDEX: 26.06 KG/M2 | WEIGHT: 214 LBS | DIASTOLIC BLOOD PRESSURE: 84 MMHG | OXYGEN SATURATION: 99 % | HEIGHT: 76 IN | SYSTOLIC BLOOD PRESSURE: 118 MMHG

## 2020-11-30 NOTE — TELEPHONE ENCOUNTER
Thanks Fern. Can you please update my \"weight\" as recorded during last week's visit? The ~234 lbs is an error. Instead I weighed in around ~214 lbs with clothes/shoes on. Please confirm to ensure accuracy.       updated

## 2020-12-28 ENCOUNTER — PATIENT MESSAGE (OUTPATIENT)
Dept: FAMILY MEDICINE CLINIC | Age: 35
End: 2020-12-28

## 2021-01-25 NOTE — TELEPHONE ENCOUNTER
Left voicemail for Kate Clark and sent [employer] message to inform him that Dr. Gonzalo Olmos has agreed to see his wife as a new patient.
Ok to check with dr. Tim Wilburn to see if they will accept his wife
Please advise    Pt f/u on message
Please advise on # 2    Dr. Greg Holm,  Following up on two items from my last visit:  1) I've decided not to proceed with the optional bloodwork this year. Instead let's plan to do this ahead of my next visit. Is there anything I need to do to cancel your \"order\" in the meantime? 2) Can you please check with your colleagues (Dr. Cleo Emerson or the other female doc you mentioned) about onboarding my fiancé as discussed? She recently moved to [place] from [place] and we'd really appreciate the opportunity to ensure trusted primary care under one roof.    Thanks, Tomy Singleton
That is fine to schedule her as a new patient.
Number Of Freeze-Thaw Cycles: 2 freeze-thaw cycles
Render Post-Care Instructions In Note?: no
Post-Care Instructions: I reviewed with the patient in detail post-care instructions. Patient is to wear sunprotection, and avoid picking at any of the treated lesions. Pt may apply Vaseline to crusted or scabbing areas.
Detail Level: Detailed
Duration Of Freeze Thaw-Cycle (Seconds): 5
Consent: The patient's consent was obtained including but not limited to risks of crusting, scabbing, blistering, scarring, darker or lighter pigmentary change, recurrence, incomplete removal and infection.
Duration Of Freeze Thaw-Cycle (Seconds): 5-10
Medical Necessity Information: It is in your best interest to select a reason for this procedure from the list below. All of these items fulfill various CMS LCD requirements except the new and changing color options.
Post-Care Instructions: No charge per MM
Medical Necessity Clause: This procedure was medically necessary because the lesions that were treated were: irritated

## 2021-03-22 ENCOUNTER — PATIENT MESSAGE (OUTPATIENT)
Dept: DERMATOLOGY | Age: 36
End: 2021-03-22

## 2021-03-22 NOTE — TELEPHONE ENCOUNTER
From: Bertha Scott  To: Jovanny Grigsby MD  Sent: 3/22/2021 3:07 PM EDT  Subject: Visit Follow-Up Question    I'm moving and have a change of address. Please update my file:     [address], [address]    Thanks!   Ginny Gaytan

## 2021-04-30 ENCOUNTER — TELEPHONE (OUTPATIENT)
Dept: FAMILY MEDICINE CLINIC | Age: 36
End: 2021-04-30

## 2021-04-30 NOTE — TELEPHONE ENCOUNTER
----- Message from [facility] sent at 4/30/2021  3:32 PM EDT -----  Subject: Message to Provider    QUESTIONS  Information for Provider? patient would like to speak to someone in the   office. he has basic questions about his health plan. call patient back. ---------------------------------------------------------------------------  --------------  [place] INFO  What is the best way for the office to contact you? OK to leave message on   voicemail  Preferred Call Back Phone Number? [phone]  ---------------------------------------------------------------------------  --------------  SCRIPT ANSWERS  Relationship to Patient?  Self

## 2021-07-21 RX ORDER — ADAPALENE AND BENZOYL PEROXIDE 3; 25 MG/G; MG/G
GEL TOPICAL
Qty: 45 G | Refills: 1 | Status: SHIPPED | OUTPATIENT
Start: 2021-07-21 | End: 2021-11-24

## 2021-07-21 NOTE — TELEPHONE ENCOUNTER
Dr Gayle Chopra patient  Pt c/b #[phone]  Pt stated  Due to having to cancel his appts multiple times due to insurance purposes pt wanted to know can he have a refill on his Adapalene-Benzoyl Peroxide (EPIDUO FORTE) 0.3-2.5 % GEL   Preferred pharmacy [place] [address], [address] [facility] [phone] - F [phone]

## 2021-11-24 RX ORDER — ADAPALENE AND BENZOYL PEROXIDE 3; 25 MG/G; MG/G
GEL TOPICAL
Qty: 45 G | Refills: 0 | Status: SHIPPED | OUTPATIENT
Start: 2021-11-24 | End: 2022-01-12 | Stop reason: SDUPTHER

## 2022-01-12 ENCOUNTER — OFFICE VISIT (OUTPATIENT)
Dept: DERMATOLOGY | Age: 37
End: 2022-01-12

## 2022-01-12 VITALS — TEMPERATURE: 97.2 F

## 2022-01-12 DIAGNOSIS — D22.9 MULTIPLE NEVI: Primary | ICD-10-CM

## 2022-01-12 PROCEDURE — 99212 OFFICE O/P EST SF 10 MIN: CPT | Performed by: DERMATOLOGY

## 2022-01-12 RX ORDER — ADAPALENE AND BENZOYL PEROXIDE 3; 25 MG/G; MG/G
GEL TOPICAL
Qty: 45 G | Refills: 3 | Status: SHIPPED | OUTPATIENT
Start: 2022-01-12

## 2022-04-15 ENCOUNTER — TELEPHONE (OUTPATIENT)
Dept: FAMILY MEDICINE CLINIC | Age: 37
End: 2022-04-15

## 2022-04-15 DIAGNOSIS — Z00.00 ROUTINE GENERAL MEDICAL EXAMINATION AT A HEALTH CARE FACILITY: Primary | ICD-10-CM

## 2022-04-15 NOTE — TELEPHONE ENCOUNTER
Patient would like blood work ordered before his next scheduled physical. He would like the same order that was place on his last visit in October 2018, however he would like his blood type and his A1C added to the order. He would like to be notified on his mychart when the order has been placed.

## 2022-04-25 ENCOUNTER — OFFICE VISIT (OUTPATIENT)
Dept: FAMILY MEDICINE CLINIC | Age: 37
End: 2022-04-25
Payer: COMMERCIAL

## 2022-04-25 VITALS
WEIGHT: 215.2 LBS | RESPIRATION RATE: 14 BRPM | HEART RATE: 60 BPM | DIASTOLIC BLOOD PRESSURE: 80 MMHG | SYSTOLIC BLOOD PRESSURE: 122 MMHG | TEMPERATURE: 97.9 F | HEIGHT: 76 IN | BODY MASS INDEX: 26.2 KG/M2 | OXYGEN SATURATION: 100 %

## 2022-04-25 DIAGNOSIS — Z00.00 ROUTINE GENERAL MEDICAL EXAMINATION AT A HEALTH CARE FACILITY: Primary | ICD-10-CM

## 2022-04-25 DIAGNOSIS — M25.50 ARTHRALGIA OF MULTIPLE JOINTS: ICD-10-CM

## 2022-04-25 DIAGNOSIS — M25.532 CHRONIC PAIN OF BOTH WRISTS: ICD-10-CM

## 2022-04-25 DIAGNOSIS — H69.83 ETD (EUSTACHIAN TUBE DYSFUNCTION), BILATERAL: ICD-10-CM

## 2022-04-25 DIAGNOSIS — M25.531 CHRONIC PAIN OF BOTH WRISTS: ICD-10-CM

## 2022-04-25 DIAGNOSIS — J30.2 SEASONAL ALLERGIC RHINITIS, UNSPECIFIED TRIGGER: ICD-10-CM

## 2022-04-25 DIAGNOSIS — J34.2 DEVIATED SEPTUM: ICD-10-CM

## 2022-04-25 DIAGNOSIS — K62.5 BRBPR (BRIGHT RED BLOOD PER RECTUM): ICD-10-CM

## 2022-04-25 DIAGNOSIS — R53.83 FATIGUE, UNSPECIFIED TYPE: ICD-10-CM

## 2022-04-25 DIAGNOSIS — G89.29 CHRONIC PAIN OF BOTH WRISTS: ICD-10-CM

## 2022-04-25 PROCEDURE — 99395 PREV VISIT EST AGE 18-39: CPT | Performed by: FAMILY MEDICINE

## 2022-04-25 SDOH — ECONOMIC STABILITY: FOOD INSECURITY: WITHIN THE PAST 12 MONTHS, YOU WORRIED THAT YOUR FOOD WOULD RUN OUT BEFORE YOU GOT MONEY TO BUY MORE.: NEVER TRUE

## 2022-04-25 SDOH — ECONOMIC STABILITY: FOOD INSECURITY: WITHIN THE PAST 12 MONTHS, THE FOOD YOU BOUGHT JUST DIDN'T LAST AND YOU DIDN'T HAVE MONEY TO GET MORE.: NEVER TRUE

## 2022-04-25 SDOH — ECONOMIC STABILITY: TRANSPORTATION INSECURITY
IN THE PAST 12 MONTHS, HAS THE LACK OF TRANSPORTATION KEPT YOU FROM MEDICAL APPOINTMENTS OR FROM GETTING MEDICATIONS?: NO

## 2022-04-25 SDOH — ECONOMIC STABILITY: TRANSPORTATION INSECURITY
IN THE PAST 12 MONTHS, HAS LACK OF TRANSPORTATION KEPT YOU FROM MEETINGS, WORK, OR FROM GETTING THINGS NEEDED FOR DAILY LIVING?: NO

## 2022-04-25 ASSESSMENT — PATIENT HEALTH QUESTIONNAIRE - PHQ9
SUM OF ALL RESPONSES TO PHQ QUESTIONS 1-9: 0
SUM OF ALL RESPONSES TO PHQ9 QUESTIONS 1 & 2: 0
2. FEELING DOWN, DEPRESSED OR HOPELESS: 0
1. LITTLE INTEREST OR PLEASURE IN DOING THINGS: 0
SUM OF ALL RESPONSES TO PHQ QUESTIONS 1-9: 0

## 2022-04-25 ASSESSMENT — SOCIAL DETERMINANTS OF HEALTH (SDOH): HOW HARD IS IT FOR YOU TO PAY FOR THE VERY BASICS LIKE FOOD, HOUSING, MEDICAL CARE, AND HEATING?: NOT HARD AT ALL

## 2022-04-25 NOTE — PROGRESS NOTES
Here for well checkup, physical.  Pt states that he is feeling well overall, staying healthy. Pt that he recently got  a few months ago and that is doing well. 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 Pt does feel that he is doing well to manage the stress of COVID, has done well since transitioning back to [place] from living in [facility]. Pt does have some mild anxiety but is managing. Pt has done great with exercise, uses [employer] regularly and starting to run. No issues of abd pain, bowel changes. Pt does have a significantly deviated septum and recurrent issues of ETD, with pressure/irritation in R ear. Pt has had chronically but would like to get evaluated    Pt feels energy level seems to be doing well overall, denies any issues of chest pain, shortness of breath. Except as noted in the history of present illness as above, the review of systems is negative for the following:    General ROS: negative  Psychological ROS: negative  Allergy and Immunology ROS: negative  Hematological and Lymphatic ROS: negative  Respiratory ROS: no cough, shortness of breath, or wheezing  Cardiovascular ROS: no chest pain or dyspnea on exertion  Gastrointestinal ROS: no abdominal pain, change in bowel habits, or black or bloody stools  Genito-Urinary ROS: no dysuria, trouble voiding, or hematuria  Musculoskeletal ROS: negative  Dermatological ROS: negative      Past medical, surgical, and social history reviewed and updated. Medications and allergies reviewed and updated        /80   Pulse 60   Temp 97.9 °F (36.6 °C) (Temporal)   Resp 14   Ht 6' 4\" (1.93 m)   Wt 215 lb 3.2 oz (97.6 kg)   SpO2 100%   BMI 26.19 kg/m²   General appearance - healthy, alert, no distress  Skin - Skin color, texture, turgor normal. No rashes or lesions. No suspicious findings  Head - Normocephalic.  No masses, lesions, tenderness or abnormalities  Eyes - conjunctivae/corneas clear. Pupils equal and reactive to light and accomodation, extraocular muscles intact. Ears - External ears normal. Canals clear. Tympanic membranes normal bilaterally. Nose/Sinuses - Nares normal. Septum midline. Mucosa normal. No drainage or sinus tenderness. Oropharynx - Lips, mucosa, and tongue normal. Teeth and gums normal.   Neck - Neck supple. No adenopathy. Thyroid symmetric, normal size, no carotid bruit bilaterally  Back - Back symmetric, no curvature. Range of motion normal. No Costovertebral angle tenderness. Lungs - Percussion normal. Good diaphragmatic excursion. Lungs clear without wheeze, rales, crackles  Heart - Regular rate and rhythm, with no rub, murmur or gallop noted. Abdomen - Abdomen soft, non-tender. Bowel sounds normal. No masses, tenderness or organomegaly  Extremities - Extremities normal. No deformities, edema, or skin discoloration  Musculoskeletal - Spine ROM normal. Muscular strength intact. Peripheral pulses - radial=4/4,, femoral=4/4, popliteal=4/4, dorsalis pedis=4/4,  Neuro - Gait normal. Reflexes normal and symmetric. Sensation grossly normal.  No focal weakness  Psych - euthymic, no suicidal thoughts or ideation, mood stable. Current Outpatient Medications   Medication Sig Dispense Refill    Adapalene-Benzoyl Peroxide (EPIDUO FORTE) 0.3-2.5 % GEL Apply to the face daily for acne. 45 g 3    Multiple Vitamins-Minerals (MULTIVITAMIN ADULT PO) Take 1 tablet by mouth daily       No current facility-administered medications for this visit. ASSESSMENT / PLAN:    1. Routine general medical examination at a health care facility  No focal abnormalities on exam  Anticipatory guidance discussed. Check bloodwork as ordered    2. Deviated septum  Chronic, refer to ENT for eval and consideration of surgery  - Armando Borden MD, Otolaryngology, [facility]    3.  ETD (Eustachian tube dysfunction), bilateral  Refer ENT  - Grace Mena MD, Otolaryngology, [facility]    4. Seasonal allergic rhinitis, unspecified trigger  Cont sujpportive therapy  F/u with ENT   Consider allergy referral    5. Arthralgia of multiple joints  Exam normal  Check bloodwork to r/o underlying CTD/RA  - CK; Future  - Sedimentation Rate; Future  - Rheumatoid Factor; Future  - Cyclic Citrul Peptide Antibody, IgG; Future  - C-Reactive Protein; Future    6. Fatigue, unspecified type  Check bloodwork as ordered    7. Chronic pain of both wrists  chroinc sx, refer to ortho  Likely will need EMG/NCV for eval of nerves/elbow  - External Referral To Orthopedic Surgery    8. BRBPR (bright red blood per rectum)  Refer dr. Medhat Whelan for eval  - Fidencio Feliz MD, Colorectal Surgery, [facility]           Follow-up appointment:   Pending bloodwork results  Prn     Discussed use, benefit, and side effects of all prescribed medications. Barriers to medication compliance addressed. All patient questions answered. Pt voiced understanding. When applicable, patient's outside records were reviewed through [facility]. The patient has signed appropriate paperworks/consents.

## 2022-05-03 ENCOUNTER — OFFICE VISIT (OUTPATIENT)
Dept: ENT CLINIC | Age: 37
End: 2022-05-03
Payer: COMMERCIAL

## 2022-05-03 VITALS
HEIGHT: 76 IN | HEART RATE: 65 BPM | SYSTOLIC BLOOD PRESSURE: 129 MMHG | WEIGHT: 210 LBS | BODY MASS INDEX: 25.57 KG/M2 | DIASTOLIC BLOOD PRESSURE: 80 MMHG

## 2022-05-03 DIAGNOSIS — J30.2 SEASONAL ALLERGIES: ICD-10-CM

## 2022-05-03 DIAGNOSIS — H91.91 HEARING LOSS OF RIGHT EAR, UNSPECIFIED HEARING LOSS TYPE: Primary | ICD-10-CM

## 2022-05-03 PROCEDURE — 99204 OFFICE O/P NEW MOD 45 MIN: CPT | Performed by: OTOLARYNGOLOGY

## 2022-05-03 RX ORDER — DEXTROAMPHETAMINE SACCHARATE, AMPHETAMINE ASPARTATE, DEXTROAMPHETAMINE SULFATE AND AMPHETAMINE SULFATE 5; 5; 5; 5 MG/1; MG/1; MG/1; MG/1
10 TABLET ORAL DAILY
COMMUNITY

## 2022-05-03 RX ORDER — FLUTICASONE PROPIONATE 50 MCG
1 SPRAY, SUSPENSION (ML) NASAL DAILY
Qty: 1 EACH | Refills: 5 | Status: SHIPPED | OUTPATIENT
Start: 2022-05-03 | End: 2022-06-02

## 2022-05-03 RX ORDER — AZELASTINE 1 MG/ML
2 SPRAY, METERED NASAL 2 TIMES DAILY
Qty: 1 EACH | Refills: 5 | Status: SHIPPED | OUTPATIENT
Start: 2022-05-03 | End: 2023-05-03

## 2022-05-03 ASSESSMENT — ENCOUNTER SYMPTOMS
COUGH: 0
COLOR CHANGE: 0
BLOOD IN STOOL: 0
SINUS PAIN: 0
CHOKING: 0
CHEST TIGHTNESS: 0
WHEEZING: 0
EYE PAIN: 0
FACIAL SWELLING: 0
VOICE CHANGE: 0
DIARRHEA: 0
EYE DISCHARGE: 0
BACK PAIN: 0
VOMITING: 0
SINUS PRESSURE: 1
STRIDOR: 0
NAUSEA: 0
SHORTNESS OF BREATH: 0
SORE THROAT: 0
CONSTIPATION: 0
APNEA: 0
TROUBLE SWALLOWING: 0
RHINORRHEA: 0

## 2022-05-03 NOTE — PROGRESS NOTES
Subjective:      Patient ID: Rico Esquivel is a 39 y.o. male. HPI  Chief Complaint   Patient presents in consultation from Dr. Kristin Nolasco pressure  History of Present Illness:Mauri is a(n) 39 y.o. male who presents with a liflong history of allergies. Head pressure and congestion. Right ear pressure and muffled hearing. Had shots as kid. Not sure if helped. Moved to [place] then back Worse now. Nasal Discharge: clear  Nasal Obstruction: Yes right; constant  Post Nasal Drainage: Yes  Nasal Bleeding: No  Sense of Smell: normal  Allergy Symptoms:  Patient complains of a 12 + year history of moderate nasal congestion, pressure sensation in ears and sinus pressure. He denies purulent nasal discharge and sputum, fevers, lymphadenopathy, and sore throat. These symptoms are perennial. Current triggers include: no known precipitant. He has tried nothing. Immunotherapy has been used but cannot remeber if useful. The patient has no history of asthma. .  The patient has no history of eczema. .  The patient does not suffer from frequent sinopulmonary infections. .  He has not had sinus surgery in the past. Symptoms are worsening over time. Current allergist:  No.  History of Facial/Head Trauma: Yes.  Broken nose x 2  Pain/Discomfort:No  Headaches: Denies  Aspirin Sensitivity: No  Eye Symptoms: none  Previous Treatments: As above         Patient Active Problem List   Diagnosis    Allergic rhinitis    Male pattern alopecia    Raynaud's phenomenon without gangrene     Past Surgical History:   Procedure Laterality Date    VARICOCELECTOMY       Family History   Problem Relation Age of Onset    Cancer Paternal Grandfather         melanoma       Social History     Socioeconomic History    Marital status:      Spouse name: Not on file    Number of children: Not on file    Years of education: Not on file    Highest education level: Not on file   Occupational History    Not on file   Tobacco Use    Smoking status: Former Smoker     Years: 10.00     Types: Cigars    Smokeless tobacco: Never Used   Substance and Sexual Activity    Alcohol use: Yes     Comment: socially    Drug use: Not on file    Sexual activity: Not on file   Other Topics Concern    Not on file   Social History Narrative    Not on file     Social Determinants of Health     Financial Resource Strain: Low Risk     Difficulty of Paying Living Expenses: Not hard at all   Food Insecurity: No Food Insecurity    Worried About [address] in the Last Year: Never true    [facility] in the Last Year: Never true   Transportation Needs: No Transportation Needs    Lack of Transportation (Medical): No    Lack of Transportation (Non-Medical): No   Physical Activity:     Days of Exercise per Week: Not on file    Minutes of Exercise per Session: Not on file   Stress:     Feeling of Stress : Not on file   Social Connections:     Frequency of Communication with Friends and Family: Not on file    Frequency of Social Gatherings with Friends and Family: Not on file    Attends Anglican Services: Not on file    Active Member of [address] [place] [address] or Organizations: Not on file    Attends Club or Organization Meetings: Not on file    Marital Status: Not on file   Intimate Partner Violence:     Fear of Current or Ex-Partner: Not on file    Emotionally Abused: Not on file    Physically Abused: Not on file    Sexually Abused: Not on file   Housing Stability:     Unable to Pay for Housing in the Last Year: Not on file    Number of [place] in the Last Year: Not on file    Unstable Housing in the Last Year: Not on file       DRUG/FOOD ALLERGIES: Amoxicillin    CURRENT MEDICATIONS  Prior to Admission medications    Medication Sig Start Date End Date Taking?  Authorizing Provider   azelastine (ASTELIN) 0.1 % nasal spray 2 sprays by Nasal route 2 times daily Use in each nostril as directed 5/3/22 5/3/23 Yes Aldo Daniels MD   fluticasone [facility]) 50 MCG/ACT nasal spray 1 spray by Nasal route daily 5/3/22 6/2/22 Yes Kalyn Vila MD   amphetamine-dextroamphetamine (ADDERALL, 20MG,) 20 MG tablet Take 20 mg by mouth daily. Yes Historical Provider, MD   Adapalene-Benzoyl Peroxide (EPIDUO FORTE) 0.3-2.5 % GEL Apply to the face daily for acne. 1/12/22  Yes Ted Osorio MD   Multiple Vitamins-Minerals (MULTIVITAMIN ADULT PO) Take 1 tablet by mouth daily   Yes Historical Provider, MD       Lab Studies:  Lab Results   Component Value Date    WBC 5.5 10/11/2018    HGB 15.9 10/11/2018    HCT 45.2 10/11/2018    MCV 92.2 10/11/2018     10/11/2018     Lab Results   Component Value Date    GLUCOSE 89 10/11/2018    BUN 15 10/11/2018    CREATININE 0.9 10/11/2018    K 4.4 10/11/2018     10/11/2018    CL 99 10/11/2018    CALCIUM 9.9 10/11/2018     No results found for: MG  No results found for: PHOS  Lab Results   Component Value Date    ALKPHOS 44 10/11/2018    ALT 18 10/11/2018    AST 34 10/11/2018    BILITOT 1.7 (H) 10/11/2018    PROT 7.9 10/11/2018     Review of Systems   Constitutional: Negative for activity change, appetite change, chills, fatigue and fever. HENT: Positive for congestion, ear pain, hearing loss and sinus pressure. Negative for dental problem, drooling, ear discharge, facial swelling, mouth sores, nosebleeds, postnasal drip, rhinorrhea, sinus pain, sneezing, sore throat, tinnitus, trouble swallowing and voice change. Eyes: Negative for pain, discharge and visual disturbance. Respiratory: Negative for apnea, cough, choking, chest tightness, shortness of breath, wheezing and stridor. Cardiovascular: Negative for palpitations. Gastrointestinal: Negative for blood in stool, constipation, diarrhea, nausea and vomiting. Endocrine: Negative for cold intolerance, heat intolerance, polydipsia, polyphagia and polyuria. Musculoskeletal: Negative for back pain, gait problem, neck pain and neck stiffness.    Skin: Negative for color change, pallor, rash and wound. Allergic/Immunologic: Negative for environmental allergies, food allergies and immunocompromised state. Neurological: Negative for dizziness, facial asymmetry, speech difficulty, light-headedness, numbness and headaches. Hematological: Negative for adenopathy. Does not bruise/bleed easily. Psychiatric/Behavioral: Negative for agitation, confusion, self-injury and sleep disturbance. The patient is not nervous/anxious. Objective:   Physical Exam  Constitutional:       Appearance: He is well-developed. He is not ill-appearing. HENT:      Head: Normocephalic and atraumatic. Not macrocephalic and not microcephalic. No raccoon eyes, Mata's sign, abrasion, contusion, right periorbital erythema, left periorbital erythema or laceration. Hair is normal.      Jaw: No trismus. Salivary Glands: Right salivary gland is not diffusely enlarged. Left salivary gland is not diffusely enlarged. Right Ear: Hearing, tympanic membrane and external ear normal. No decreased hearing noted. No drainage, swelling or tenderness. No middle ear effusion. No mastoid tenderness. Tympanic membrane is not perforated, retracted or bulging. Tympanic membrane has normal mobility. Left Ear: Hearing, tympanic membrane and external ear normal. No decreased hearing noted. No drainage, swelling or tenderness. No middle ear effusion. No mastoid tenderness. Tympanic membrane is not perforated, retracted or bulging. Tympanic membrane has normal mobility. Ears:      Shen exam findings: does not lateralize. Right Rinne: AC > BC. Left Rinne: AC > BC. Nose: No nasal deformity, septal deviation, laceration, mucosal edema or rhinorrhea. Right Nostril: No epistaxis. Left Nostril: No epistaxis. Right Turbinates: Not enlarged. Left Turbinates: Not enlarged. Right Sinus: No maxillary sinus tenderness or frontal sinus tenderness.       Left Sinus: No maxillary sinus tenderness or frontal sinus tenderness. Mouth/Throat:      Lips: No lesions. Mouth: Mucous membranes are not pale, not dry and not cyanotic. No lacerations or oral lesions. Dentition: Normal dentition. No dental caries or dental abscesses. Tongue: No lesions. Palate: No mass. Pharynx: Uvula midline. No oropharyngeal exudate, posterior oropharyngeal erythema or uvula swelling. Tonsils: No tonsillar abscesses. Eyes:      General: Lids are normal. Lids are everted, no foreign bodies appreciated. Right eye: No discharge. Left eye: No discharge. Extraocular Movements:      Right eye: Normal extraocular motion and no nystagmus. Left eye: Normal extraocular motion and no nystagmus. Conjunctiva/sclera:      Right eye: No chemosis or exudate. Left eye: No chemosis or exudate. Neck:      Thyroid: No thyroid mass or thyromegaly. Vascular: Normal carotid pulses. Trachea: Trachea normal. No tracheal deviation. Cardiovascular:      Rate and Rhythm: Normal rate and regular rhythm. Pulmonary:      Effort: No tachypnea, bradypnea or respiratory distress. Breath sounds: No stridor. Musculoskeletal:      Right shoulder: Normal range of motion. Left shoulder: Normal range of motion. Cervical back: Neck supple. Lymphadenopathy:      Head:      Right side of head: No submental, submandibular, tonsillar, preauricular, posterior auricular or occipital adenopathy. Left side of head: No submental, submandibular, tonsillar, preauricular, posterior auricular or occipital adenopathy. Cervical:      Right cervical: No superficial, deep or posterior cervical adenopathy. Left cervical: No superficial, deep or posterior cervical adenopathy. Skin:     Findings: No bruising, erythema, laceration or lesion. Neurological:      Mental Status: He is alert and oriented to person, place, and time.    Psychiatric: Speech: Speech normal.         Behavior: Behavior normal.         Assessment:       Diagnosis Orders   1. Hearing loss of right ear, unspecified hearing loss type  Audiometry with tympanometry   2. Seasonal allergies  [place] ALLERGY SKIN TESTS    [place] INTRACUTANEOUS TESTS W/ALLERGENIC EXTRACTS    azelastine (ASTELIN) 0.1 % nasal spray    fluticasone (FLONASE) 50 MCG/ACT nasal spray           Plan:      Schedule audio. Schedule allergy testing. Start flonase and azelastine. Follow up after testing.          Mame Gallego MD

## 2022-05-03 NOTE — LETTER
[facility]  [address] [place] [address]  Phone: [phone]  Fax: [phone] Yobany Yeh MD      May 3, 2022     Patient: Hilary Terrell   MR Number: [id number]   YOB: 1985   Date of Visit: 5/3/2022       Dear Dr. Haydee Thomson: Thank you for referring Hilary Terrell to me for evaluation/treatment. Below are the relevant portions of my assessment and plan of care. Diagnosis Orders   1. Hearing loss of right ear, unspecified hearing loss type  Audiometry with tympanometry   2. Seasonal allergies  [place] ALLERGY SKIN TESTS    [place] INTRACUTANEOUS TESTS W/ALLERGENIC EXTRACTS    azelastine (ASTELIN) 0.1 % nasal spray    fluticasone (FLONASE) 50 MCG/ACT nasal spray         Schedule audio. Schedule allergy testing. Start flonase and azelastine. Follow up after testing. If you have questions, please do not hesitate to call me. I look forward to following Delbra Bloch along with you.     Sincerely,        Miguel Sweeney MD    CC providers:  Royal Sean MD  [address] [address] [place] [address]  [address] [place]

## 2022-05-04 ENCOUNTER — TELEPHONE (OUTPATIENT)
Dept: ENT CLINIC | Age: 37
End: 2022-05-04

## 2022-05-04 DIAGNOSIS — M25.50 ARTHRALGIA OF MULTIPLE JOINTS: ICD-10-CM

## 2022-05-04 DIAGNOSIS — Z00.00 ROUTINE GENERAL MEDICAL EXAMINATION AT A HEALTH CARE FACILITY: ICD-10-CM

## 2022-05-04 LAB
A/G RATIO: 1.9 (ref 1.1–2.2)
ABO/RH: NORMAL
ALBUMIN SERPL-MCNC: 4.7 G/DL (ref 3.4–5)
ALP BLD-CCNC: 44 U/L (ref 40–129)
ALT SERPL-CCNC: 16 U/L (ref 10–40)
ANION GAP SERPL CALCULATED.3IONS-SCNC: 12 MMOL/L (ref 3–16)
AST SERPL-CCNC: 30 U/L (ref 15–37)
BILIRUB SERPL-MCNC: 1.8 MG/DL (ref 0–1)
BUN BLDV-MCNC: 16 MG/DL (ref 7–20)
C-REACTIVE PROTEIN: <3 MG/L (ref 0–5.1)
CALCIUM SERPL-MCNC: 9.5 MG/DL (ref 8.3–10.6)
CHLORIDE BLD-SCNC: 99 MMOL/L (ref 99–110)
CHOLESTEROL, TOTAL: 187 MG/DL (ref 0–199)
CO2: 26 MMOL/L (ref 21–32)
CREAT SERPL-MCNC: 1 MG/DL (ref 0.9–1.3)
FOLATE: 17.21 NG/ML (ref 4.78–24.2)
GFR AFRICAN AMERICAN: >60
GFR NON-AFRICAN AMERICAN: >60
GLUCOSE BLD-MCNC: 101 MG/DL (ref 70–99)
HCT VFR BLD CALC: 42.5 % (ref 40.5–52.5)
HDLC SERPL-MCNC: 45 MG/DL (ref 40–60)
HEMOGLOBIN: 15.1 G/DL (ref 13.5–17.5)
LDL CHOLESTEROL CALCULATED: 121 MG/DL
MCH RBC QN AUTO: 32.8 PG (ref 26–34)
MCHC RBC AUTO-ENTMCNC: 35.5 G/DL (ref 31–36)
MCV RBC AUTO: 92.2 FL (ref 80–100)
PDW BLD-RTO: 13.2 % (ref 12.4–15.4)
PLATELET # BLD: 129 K/UL (ref 135–450)
PMV BLD AUTO: 9 FL (ref 5–10.5)
POTASSIUM SERPL-SCNC: 4.2 MMOL/L (ref 3.5–5.1)
RBC # BLD: 4.61 M/UL (ref 4.2–5.9)
RHEUMATOID FACTOR: <10 IU/ML
SEDIMENTATION RATE, ERYTHROCYTE: 2 MM/HR (ref 0–15)
SODIUM BLD-SCNC: 137 MMOL/L (ref 136–145)
TOTAL CK: 362 U/L (ref 39–308)
TOTAL PROTEIN: 7.2 G/DL (ref 6.4–8.2)
TRIGL SERPL-MCNC: 103 MG/DL (ref 0–150)
TSH SERPL DL<=0.05 MIU/L-ACNC: 2.72 UIU/ML (ref 0.27–4.2)
VITAMIN B-12: 767 PG/ML (ref 211–911)
VITAMIN D 25-HYDROXY: 34.4 NG/ML
VLDLC SERPL CALC-MCNC: 21 MG/DL
WBC # BLD: 5.3 K/UL (ref 4–11)

## 2022-05-04 NOTE — TELEPHONE ENCOUNTER
Asked by Dr. Edwin Logan to discuss allergy testing with this patient. Allergy Welcome Packet given to patient in the office and contents reviewed over the phone. Patient will call to check insurance coverage. Pt verb understanding to stop taking all antihistamines 7 days prior to testing. He is not currently on any oral antihistamines. Instructed to stop taking Astelin spray for 2 days before testing. Allergy testing scheduled for 5/26/22.

## 2022-05-04 NOTE — PROGRESS NOTES
Jorge Alfonso   1985, 39 y.o. male   [id number]       Referring Provider: Argyle Peabody, MD, PhD  Referral Type: In an order in [address] [place] [address]    Reason for Visit: Evaluation of suspected change in hearing, tinnitus, or balance. ADULT AUDIOLOGIC EVALUATION      Jorge Alfonso is a 39 y.o. male seen today, 5/5/2022, for an initial audiologic evaluation. AUDIOLOGIC AND OTHER PERTINENT MEDICAL HISTORY:        Jorge Alfonso noted decreased hearing and ear fullness RE>LE, ears; right ear pain; noted some intermittent ringing in ears; history of head trauma from basketball in high school; some noise exposure from concerts or other loud music, otherwise no remarkable noise history. Jorge Alfonso denied otorrhea, dizziness, imbalance, history of ear surgery, and family history of early onset hearing loss. IMPRESSIONS:       Today's results are consistent with left ear mid-high frequency mild sensorineural hearing loss with right ear essentially within normal limits with mild sensorineural notch at 3000 Hz only; both ears with hypermobile TMs and excellent word recognition for conversational speech. Hearing loss is significant enough to result in difficulty understanding speech in at least some listening environments. Discussed good communication strategies, considerations for amplification, and safe listening levels; follow medical recommendations from Dr. Forrest Lucio. ASSESSMENT AND FINDINGS:       Otoscopy revealed: Clear ear canals bilaterally      RIGHT EAR:  Hearing Sensitivity: Mild sensorineural hearing loss notch at 3000 Hz, otherwise within normal limits. Speech Recognition Threshold: 10 dBHL  Word Recognition: Excellent (100%), based on NU-6 25-word list at 50 dBHL using recorded speech stimuli. Tympanometry: Normal peak pressure with high compliance, Type Ad tympanogram, consistent with hypermobile tympanic membrane.       LEFT EAR:  Hearing Sensitivity: Within normal limits through 1000 Hz sloping to mild sensorineural hearing loss. Speech Recognition Threshold: 25 dBHL  Word Recognition: Excellent (96%), based on NU-6 25-word list at 55 dBHL using recorded speech stimuli. Tympanometry: Normal peak pressure with high compliance, Type Ad tympanogram, consistent with hypermobile tympanic membrane. NOTE: An asymmetry of 15-30 dBHL is present 3423-6183 Hz and 7847-0454 Hz with left ear worse than right ear. Reliability: Good  Transducer: Inserts, checked with Phones    See scanned audiogram dated 5/5/2022 for results. PATIENT EDUCATION:       The following items were discussed with the patient:   - Good Communication Strategies  - Hearing Loss and Hearing Aids  - Tinnitus Management Strategies    - Noise-Induced Hearing Loss and use of Hearing Protection Devices (HPDs)     Educational information was shared in the After Visit Summary. RECOMMENDATIONS:                                                                                                                                                                                                                                                                      The following items are recommended based on patient report and results from today's appointment:  - Continue medical follow-up with Delia Pacheco MD, PhD.  - [facility] [place] hearing as medically indicated and/or sooner if a change in hearing is noted. - If desired, schedule a Hearing Aid Evaluation (HAE) appointment to discuss hearing aid options. - Utilize \"Good Communication Strategies\" as discussed to assist in speech understanding with communication partners. - Maintain a sound enriched environment to assist in the management of tinnitus symptoms.  - Use hearing protection devices (HPDs), such as protective ear muffs and ear plugs, when exposed to dangerous sound levels.          [facility], [facility]  Audiologist      Chart CC'd to: Shirley El MD, PhD      Degree of   Hearing Sensitivity dB Range   Within Normal Limits (WNL) 0 - 20   Mild 20 - 40   Moderate 40 - 55   Moderately-Severe 55 - 70   Severe 70 - 90   Profound 90 +

## 2022-05-05 ENCOUNTER — PROCEDURE VISIT (OUTPATIENT)
Dept: AUDIOLOGY | Age: 37
End: 2022-05-05
Payer: COMMERCIAL

## 2022-05-05 DIAGNOSIS — H92.03 OTALGIA, BILATERAL: ICD-10-CM

## 2022-05-05 DIAGNOSIS — H90.3 SENSORINEURAL HEARING LOSS, BILATERAL: Primary | ICD-10-CM

## 2022-05-05 DIAGNOSIS — H93.13 TINNITUS, BILATERAL: ICD-10-CM

## 2022-05-05 DIAGNOSIS — H93.8X3 EAR PRESSURE, BILATERAL: ICD-10-CM

## 2022-05-05 LAB
CYCLIC CITRULLINATED PEPTIDE ANTIBODY IGG: <0.5 U/ML (ref 0–2.9)
ESTIMATED AVERAGE GLUCOSE: 68.1 MG/DL
HBA1C MFR BLD: 4 %

## 2022-05-05 PROCEDURE — 92557 COMPREHENSIVE HEARING TEST: CPT | Performed by: AUDIOLOGIST

## 2022-05-05 PROCEDURE — 92567 TYMPANOMETRY: CPT | Performed by: AUDIOLOGIST

## 2022-05-05 NOTE — Clinical Note
Dr. Yvonna Duverney,    Please see note from this patient's audiogram from today. Please let me know if there is anything further you need.       Barbie Ewing [id number] [facility]  Audiologist

## 2022-05-05 NOTE — PATIENT INSTRUCTIONS
Good Communication Strategies    Communication can be challenging for anyone, but can be especially difficult for those with some degree of hearing loss. While we may not be able to control every factor that may lead to difficulty with communication, there are Good Communication Strategies that we can all use in our day-to-day lives. Communication takes both parties working together for it to be successful. Tips as a Listener:   1. Control your environment. It is important to limit the amount of background noise in the room when possible. You should also consider having a good light source in the room to best see the other person. 2. Ask for clarification. Instead of saying \"What?\", you can use parts of what you heard to make a new question. For example, if you heard the word \"Thursday\" but not the rest of the week, you may ask \"What was that about Thursday? \" or \"What did you want to do Thursday? \". This shows the person talking that you are listening and will help them better explain what they are saying. 3. Be an advocate for yourself. If you are hearing but not understanding, tell the other person \"I can hear you, but I need you to slow down when you speak. \"  Or if someone is facing the other direction, say \"I cannot hear you when you are not looking at me when we talk. \"       Tips as a Talker:   - Sit or stand 3 to 6 feet away to maximize audibility         -- It is unrealistic to believe someone else will fully hear your message if you are speaking from across the room or in a different room in the house   - Stay at eye level to help with visual cues   - Make sure you have the persons attention before speaking   - Use facial expressions and gestures to accentuate your message   - Raise your voice slightly (do not scream)   - Speak slowly and distinctly   - Use short, simple sentences   - Rephrase your words if the person is having a hard time understanding you    - To avoid distortion, dont speak directly into a persons ear      Some additional items that may be helpful:   - Remain patient - this is important for both parties   - Write down items that still cannot be heard/understood. You may write with pen/paper or consider typing/texting on a cell phone or smart device. - If background noise is unavoidable, try to keep yourself in a good position in the room. By sitting at a zhou on the side of the restaurant (preferably a corner), it will be easier to communicate than if you were sitting at a table in the middle with background noise surrounding you. Keep yourself positioned away from music speakers or heavy foot traffic.   - If you have difficulty with the television, consider these options:      -- Use closed-captioning, which is a setting you can turn on that displays the spoken words in a written form on the screen. There may be a slight delay, but this can help fill in missing information. This can be especially helpful when watching programs with accented speech. -- Consider use of a sound bar or speakers that come from the front of the TV. With modern flat screen TVs, many of them have speakers that come out of the back of the device, which makes sound bounce off the wall behind it, then go into the room. Sound bars can allow the sound to go straight in your direction and can improve sound quality. -- Consider ear level devices to help improve the volume and/or sound quality of the program.  There are devices that work like headphones that you can adjust the volume for your ears while others can have the volume at a more comfortable level, such as \"TV Ears\". Most hearing aids have devices that allow them to connect directly to the TV and improve sound quality. Hearing Loss: Care Instructions  Your Care Instructions      Hearing loss is a sudden or slow decrease in how well you hear. It can range from mild to profound.  Permanent hearing loss can occur with aging, and it can happen when you are exposed long-term to loud noise. Examples include listening to loud music, riding motorcycles, or being around other loud machines. Hearing loss can affect your work and home life. It can make you feel lonely or depressed. You may feel that you have lost your independence. But hearing aids and other devices can help you hear better and feel connected to others. Follow-up care is a key part of your treatment and safety. Be sure to make and go to all appointments, and call your doctor if you are having problems. It's also a good idea to know your test results and keep a list of the medicines you take. How can you care for yourself at home? · Avoid loud noises whenever possible. This helps keep your hearing from getting worse. Always wear hearing protection around loud noises. · If appropriate, wear hearing aid(s) as directed. It is recommended that hearing aids are worn during all waking hours to keep your brain active and give it access to the sounds it is missing. · If you are beginning your process with hearing aid(s), schedule a \"Hearing Aid Evaluation\" with an audiologist to discuss your lifestyle, features of hearing aid technology, and styles of hearing aids available. It is recommended that you contact your insurance company to determine if you have a hearing aid benefit, as this may dictate who you can see for these services. · Have hearing tests as your doctor suggests. They can show whether your hearing has changed. Your hearing aid may need to be adjusted. · Use other assistive devices as needed. These may include:  ? Telephone amplifiers and hearing aids that can connect to a television, stereo, radio, or microphone. ? Devices that use lights or vibrations. These alert you to the doorbell, a ringing telephone, or a baby monitor. ? Television closed-captioning. This shows the words at the bottom of the screen. Most new TVs can do this. ? TTY (text telephone). This lets you type messages back and forth on the telephone instead of talking or listening. These devices are also called TDD. When messages are typed on the keyboard, they are sent over the phone line to a receiving TTY. The message is shown on a monitor. · Use pagers, fax machines, text, and email if it is hard for you to communicate by telephone. · Try to learn a listening technique called speech-reading. It is not lip-reading. You pay attention to people's gestures, expressions, posture, and tone of voice. These clues can help you understand what a person is saying. Face the person you are talking to, and have him or her face you. Make sure the lighting is good. You need to see the other person's face clearly. · Think about counseling if you need help to adjust to your hearing loss. When should you call for help? Watch closely for changes in your health, and be sure to contact your doctor if:    · You think your hearing is getting worse. · You have new symptoms, such as dizziness or nausea. Tinnitus: Overview and Management Strategies          Many people have some ringing sounds in their ears once in a while. You may hear a roar, a hiss, a tinkle, or a buzz. The sound usually lasts only a few minutes. If it goes on all the time, you may have tinnitus. Tinnitus is usually caused by long-term exposure to loud noise. This damages the nerves in the inner ear. It can occur with all types of hearing loss. It may be a symptom of almost any ear problem. Tinnitus may be caused by a buildup of earwax. Or, it may be caused by ear infections or certain medicines (especially antibiotics or large amounts of aspirin). You can also hear noises in your ears because of an injury to the ears, drinking too much alcohol or caffeine, or a medical condition.   Other conditions may also contribute to tinnitus, including: head and neck trauma, temporomandibular joint disorder (TMJ), sinus pressure and barometric trauma, traumatic brain injury, metabolic disorders, autoimmune disorders, stress, and high blood pressure. You may need tests to evaluate your hearing and to find causes of long-lasting tinnitus. Your doctor may suggest one or more treatments to help you cope with the tinnitus. You can also do things at home to help reduce symptoms. Follow-up care is a key part of your treatment and safety. Be sure to make and go to all appointments, and call your doctor if you are having problems. It's also a good idea to know your test results and keep a list of the medicines you take. How can you care for yourself at home? · Limit or cut out alcohol, caffeine, and sodium. They can make your symptoms worse. · Do not smoke or use other tobacco products. Nicotine reduces blood flow to the ear and makes tinnitus worse. If you need help quitting, talk to your doctor about stop-smoking programs and medicines. These can increase your chances of quitting for good. · Talk to your doctor about whether to stop taking aspirin and similar products such as ibuprofen or naproxen. · Get exercise often. It can help improve blood flow to the ear. Ways to manage/cope with tinnitus  Some tinnitus may last a long time. To manage your tinnitus, try to:  · Avoid noises that you think caused your tinnitus. If you can't avoid loud noises, wear earplugs or earmuffs. · Ignore the sound by paying attention to other things. Keeping your brain busy with other tasks or background noise can help your brain not focus on the tinnitus. · Try to not give the tinnitus an emotional reaction. Do your best to ignore the sound and not let it bother you. Relax using biofeedback, meditation, or yoga. Feeling stressed and being tired can make tinnitus worse. · Play music or white noise to help you sleep. Background noise may cover up the noise that you hear in your ears.   You can buy a tabletop machine or a device that sits under your pillow to play soothing sounds, like ocean waves. · Smart phones have free apps, such as Whist, Relax Melodies, ReSound Relief, and White Noise Lite. These apps have different types of sounds/noise, some of which you can blend together to find sounds that are most soothing to you. · Hearing aid technology, especially when there is some hearing loss, may help reduce tinnitus symptoms by giving your brain better access to the sounds it is missing. There are some hearing aids with built-in noise generator programs, which may help when amplification alone is not enough. Additional resources may be found through the American Tinnitus Association at www.hong.org    When should you call for help? Call 911 anytime you think you may need emergency care. For example, call if:    · You have symptoms of a stroke. These may include:  ? Sudden numbness, tingling, weakness, or loss of movement in your face, arm, or leg, especially on only one side of your body. ? Sudden vision changes. ? Sudden trouble speaking. ? Sudden confusion or trouble understanding simple statements. ? Sudden problems with walking or balance. ? A sudden, severe headache that is different from past headaches. Call your doctor now or seek immediate medical care if:    · You develop other symptoms. These may include hearing loss (or worse hearing loss), balance problems, dizziness, nausea, or vomiting. Watch closely for changes in your health, and be sure to contact your doctor if:    · Your tinnitus moves from both ears to one ear. · Your hearing loss gets worse within 1 day after an ear injury. · Your tinnitus or hearing loss does not get better within 1 week after an ear injury. · Your tinnitus bothers you enough that you want to take medicines to help you cope with it.       If you notice changes in your tinnitus and/or your hearing, it is recommended that you have your hearing tested by your audiologist and to follow-up with your physician that manages your hearing loss (such as your ENT or Primary Care doctor). Noise-Induced Hearing Loss  What it is, and what you can do to prevent it    Exposure to loud sounds, in an occupational setting or recreational, can cause permanent hearing loss. Sound is measured in decibels (dB). Noise-induced hearing loss is the ONLY type of preventable hearing loss. Hearing loss related to noise exposure can occur at any age. There are small sensory cells, called inner and outer hair cells, within the inner ear (cochlea). These cells process the loudness (intensity) and pitch (frequency) of sound and send the signal to the brain via our auditory nerve (vestibulocochlear nerve, cranial nerve VIII). When these cells are damaged, they can result in permanent hearing loss and/or tinnitus. The hair cells responsible for high frequency sounds, like birds chirping, are most likely to be damaged due to loud sounds. The high frequency sounds are also very important for our clarity and understanding of speech. OCCUPATIONAL NOISE EXPOSURE RECREATIONAL NOISE EXPOSURE   Some jobs may have exposure to loud sounds in the workplace. These jobs may include but are not limited to:  [employer]   Welding   Landscaping   Hairdressing/hairstyling   [employer]    ... And more! Many activities outside of work may cause permanent hearing loss. These activities may include but are not limited to:  Lawnmowers, leaf blowers  [employer] (such as pigs squealing)   Chainsaws and other power tools  [employer] musical instruments and/or singing   Listening to music too loudly - at concerts, through stereo, through ear buds or headphones   Attending sporting events   Attending fireworks shows or using fireworks at home  [employer] of firearms   . .. And more!        REDUCE OR PROTECT YOUR EARS FROM NOISE EXPOSURE    To do your best to avoid noise-induced hearing loss, here are some tips:   Limit exposure to loud sounds. 85 dB (decibels) is safe for 8 hours. As sounds are louder, the length of time the sound is safe lessens. These numbers are cumulative across a 24-hour period. (NIOSH and CDC, 2002)  o 85 dB is safe for 8 hours  o 88 dB is safe for 4 hours  o 91 dB is safe for 2 hours  o 94 dB is safe for 1 hour  o 97 dB is safe for 30 minutes  o 100 dB is safe for 15 minutes  o 103 dB is safe for 7.5 minutes  o 106 dB is safe for 3.75 minutes  o 109 dB is safe for LESS THAN 2 minutes  o 112 dB is safe for LESS THAN 1 minute  o 115 dB is safe for ~ 30 seconds  o 130 dB can cause IMMEDIATE hearing loss   If you are unsure if a sound is too loud, consider checking the sound level with a \"sound level meter\". There are apps on smart devices, such as \"Decibel X\", that can measure the loudness of the sound. They are not as accurate as expensive equipment used by scientists, but it will give you a guesstimate of how loud the sound is, and if it may be damaging to your hearing.  If you cannot avoid loud sounds, here are ways to reduce your exposure:  o 1. Wear hearing protection  - Ear plugs and protective ear muffs can be used to reduce the intensity of the sound. The higher the NRR (noise reduction rating), the better reduction of the intensity of the sound   o 2. Turn the volume down  - When listening to music, turn the volume down, especially when wearing ear buds or headphones. A good rule of thumb is to not go beyond the middle setting on your device. If you can't hear someone talking to you from arm's length away, your music may be at a level that it can cause damage. If someone else can hear your music from 3 feet away, it may also be at a level that it can cause damage.   o 3. Walk away from the sound  - If you do not have the ability to wear hearing protection or turn down the volume of the sound, you should do your best to move away from the source of the sound. - Sound decreases in intensity as we move further from the source. The sound will decrease by 6 dB for every doubling of distance from the sound source. TYPES OF HEARING PROTECTION    The most common types of hearing protection are protective ear muffs and ear plugs. Protective ear muffs are commonly found at home improvement or sporting good stores, they can be worn time and time again and are great if you need to take your hearing protection off frequently. Ear plugs are often made of foam or soft silicone. The foam ones are designed for one-time use, while silicone ear plugs may be used multiple times. There are also \"filtered\" ear plugs that help provide even attenuation of the sound across all frequencies. These are great for listening to music or going to concerts, and allow for better understanding of speech in louder environments. They can be purchased at music stores or online retailers (search \"Ety Plugs\" or \"filtered ear plugs\"), or custom earmolds can be made with an audiologist.    There are \"custom\" hearing protection devices that you can further discuss with your audiologist based on your specific needs, if desired. Exposure to these sounds may cause permanent damage to your hearing.   If you suspect your hearing has changed, it is recommended that you have your hearing tested by your audiologist.

## 2022-05-06 LAB — TESTOSTERONE TOTAL: 486 NG/DL (ref 220–1000)

## 2022-05-26 ENCOUNTER — NURSE ONLY (OUTPATIENT)
Dept: ENT CLINIC | Age: 37
End: 2022-05-26
Payer: COMMERCIAL

## 2022-05-26 ENCOUNTER — OFFICE VISIT (OUTPATIENT)
Dept: ENT CLINIC | Age: 37
End: 2022-05-26

## 2022-05-26 VITALS — DIASTOLIC BLOOD PRESSURE: 72 MMHG | SYSTOLIC BLOOD PRESSURE: 102 MMHG | HEART RATE: 70 BPM | TEMPERATURE: 97.7 F

## 2022-05-26 DIAGNOSIS — J30.2 SEASONAL ALLERGIES: ICD-10-CM

## 2022-05-26 DIAGNOSIS — Z29.8 IMMUNOTHERAPY: ICD-10-CM

## 2022-05-26 DIAGNOSIS — J30.2 SEASONAL ALLERGIES: Primary | ICD-10-CM

## 2022-05-26 DIAGNOSIS — H90.3 SENSORINEURAL HEARING LOSS (SNHL) OF BOTH EARS: ICD-10-CM

## 2022-05-26 PROCEDURE — 95004 PERQ TESTS W/ALRGNC XTRCS: CPT | Performed by: OTOLARYNGOLOGY

## 2022-05-26 PROCEDURE — 95024 IQ TESTS W/ALLERGENIC XTRCS: CPT | Performed by: OTOLARYNGOLOGY

## 2022-05-26 PROCEDURE — 99214 OFFICE O/P EST MOD 30 MIN: CPT | Performed by: OTOLARYNGOLOGY

## 2022-05-26 RX ORDER — EPINEPHRINE 0.3 MG/.3ML
INJECTION SUBCUTANEOUS
Qty: 1 EACH | Refills: 1 | Status: SHIPPED | OUTPATIENT
Start: 2022-05-26

## 2022-05-26 NOTE — PROGRESS NOTES
Subjective:      Patient ID: Rico Esquivel is a 39 y.o. male. HPI  Chief Complaint   Patient presents in consultation from Dr. Kristin Nolasco pressure  History of Present Illness:Mauri is a(n) 39 y.o. male who presents with a liflong history of allergies. Head pressure and congestion. Right ear pressure and muffled hearing. Had shots as kid. Not sure if helped. Moved to [place] then back Worse now. Nasal Discharge: clear  Nasal Obstruction: Yes right; constant  Post Nasal Drainage: Yes  Nasal Bleeding: No  Sense of Smell: normal  Allergy Symptoms:  Patient complains of a 12 + year history of moderate nasal congestion, pressure sensation in ears and sinus pressure. He denies purulent nasal discharge and sputum, fevers, lymphadenopathy, and sore throat. These symptoms are perennial. Current triggers include: no known precipitant. He has tried nothing. Immunotherapy has been used but cannot remeber if useful. The patient has no history of asthma. .  The patient has no history of eczema. .  The patient does not suffer from frequent sinopulmonary infections. .  He has not had sinus surgery in the past. Symptoms are worsening over time. Current allergist:  No.  History of Facial/Head Trauma: Yes. Broken nose x 2  Pain/Discomfort:No  Headaches: Denies  Aspirin Sensitivity: No  Eye Symptoms: none  Previous Treatments: As above     Reviewed and discussed allergy results. Reviewed and discussed audiogram. Formulated treatment plan. Patient agreed.          Patient Active Problem List   Diagnosis    Allergic rhinitis    Male pattern alopecia    Raynaud's phenomenon without gangrene     Past Surgical History:   Procedure Laterality Date    VARICOCELECTOMY       Family History   Problem Relation Age of Onset    Cancer Paternal Grandfather         melanoma       Social History     Socioeconomic History    Marital status:      Spouse name: Not on file    Number of children: Not on file    Years of education: Not on file    Highest education level: Not on file   Occupational History    Not on file   Tobacco Use    Smoking status: Former Smoker     Years: 10.00     Types: Cigars    Smokeless tobacco: Never Used   Substance and Sexual Activity    Alcohol use: Yes     Comment: socially    Drug use: Not on file    Sexual activity: Not on file   Other Topics Concern    Not on file   Social History Narrative    Not on file     Social Determinants of Health     Financial Resource Strain: Low Risk     Difficulty of Paying Living Expenses: Not hard at all   Food Insecurity: No Food Insecurity    Worried About [facility] in the Last Year: Never true    [facility] in the Last Year: Never true   Transportation Needs: No Transportation Needs    Lack of Transportation (Medical): No    Lack of Transportation (Non-Medical): No   Physical Activity:     Days of Exercise per Week: Not on file    Minutes of Exercise per Session: Not on file   Stress:     Feeling of Stress : Not on file   Social Connections:     Frequency of Communication with Friends and Family: Not on file    Frequency of Social Gatherings with Friends and Family: Not on file    Attends [employer] Services: Not on file    Active Member of [address] [place] [address] or Organizations: Not on file    Attends Club or Organization Meetings: Not on file    Marital Status: Not on file   Intimate Partner Violence:     Fear of Current or Ex-Partner: Not on file    Emotionally Abused: Not on file    Physically Abused: Not on file    Sexually Abused: Not on file   Housing Stability:     Unable to Pay for Housing in the Last Year: Not on file    Number of [place] in the Last Year: Not on file    Unstable Housing in the Last Year: Not on file       DRUG/FOOD ALLERGIES: Amoxicillin    CURRENT MEDICATIONS  Prior to Admission medications    Medication Sig Start Date End Date Taking?  Authorizing Provider   azelastine (ASTELIN) 0.1 % nasal spray 2 sprays by Nasal route 2 times daily Use in each nostril as directed 5/3/22 5/3/23 Yes Amelia Posadas MD   fluticasone [facility]) 50 MCG/ACT nasal spray 1 spray by Nasal route daily 5/3/22 6/2/22 Yes Amelia Posadas MD   amphetamine-dextroamphetamine (ADDERALL, 20MG,) 20 MG tablet Take 20 mg by mouth daily. Yes Historical Provider, MD   Adapalene-Benzoyl Peroxide (EPIDUO FORTE) 0.3-2.5 % GEL Apply to the face daily for acne. 1/12/22  Yes Alma Montague MD   Multiple Vitamins-Minerals (MULTIVITAMIN ADULT PO) Take 1 tablet by mouth daily   Yes Historical Provider, MD       Lab Studies:  Lab Results   Component Value Date    WBC 5.5 10/11/2018    HGB 15.9 10/11/2018    HCT 45.2 10/11/2018    MCV 92.2 10/11/2018     10/11/2018     Lab Results   Component Value Date    GLUCOSE 89 10/11/2018    BUN 15 10/11/2018    CREATININE 0.9 10/11/2018    K 4.4 10/11/2018     10/11/2018    CL 99 10/11/2018    CALCIUM 9.9 10/11/2018     No results found for: MG  No results found for: PHOS  Lab Results   Component Value Date    ALKPHOS 44 10/11/2018    ALT 18 10/11/2018    AST 34 10/11/2018    BILITOT 1.7 (H) 10/11/2018    PROT 7.9 10/11/2018     Review of Systems   Constitutional: Negative for activity change, appetite change, chills, fatigue and fever. HENT: Positive for congestion, ear pain, hearing loss and sinus pressure. Negative for dental problem, drooling, ear discharge, facial swelling, mouth sores, nosebleeds, postnasal drip, rhinorrhea, sinus pain, sneezing, sore throat, tinnitus, trouble swallowing and voice change. Eyes: Negative for pain, discharge and visual disturbance. Respiratory: Negative for apnea, cough, choking, chest tightness, shortness of breath, wheezing and stridor. Cardiovascular: Negative for palpitations. Gastrointestinal: Negative for blood in stool, constipation, diarrhea, nausea and vomiting.    Endocrine: Negative for cold intolerance, heat intolerance, polydipsia, polyphagia and polyuria. Musculoskeletal: Negative for back pain, gait problem, neck pain and neck stiffness. Skin: Negative for color change, pallor, rash and wound. Allergic/Immunologic: Negative for environmental allergies, food allergies and immunocompromised state. Neurological: Negative for dizziness, facial asymmetry, speech difficulty, light-headedness, numbness and headaches. Hematological: Negative for adenopathy. Does not bruise/bleed easily. Psychiatric/Behavioral: Negative for agitation, confusion, self-injury and sleep disturbance. The patient is not nervous/anxious. Objective:   Physical Exam  Constitutional:       Appearance: He is well-developed. He is not ill-appearing. HENT:      Head: Normocephalic and atraumatic. Not macrocephalic and not microcephalic. No raccoon eyes, Mata's sign, abrasion, contusion, right periorbital erythema, left periorbital erythema or laceration. Hair is normal.      Jaw: No trismus. Salivary Glands: Right salivary gland is not diffusely enlarged. Left salivary gland is not diffusely enlarged. Right Ear: Hearing, tympanic membrane and external ear normal. No decreased hearing noted. No drainage, swelling or tenderness. No middle ear effusion. No mastoid tenderness. Tympanic membrane is not perforated, retracted or bulging. Tympanic membrane has normal mobility. Left Ear: Hearing, tympanic membrane and external ear normal. No decreased hearing noted. No drainage, swelling or tenderness. No middle ear effusion. No mastoid tenderness. Tympanic membrane is not perforated, retracted or bulging. Tympanic membrane has normal mobility. Ears:      Shen exam findings: does not lateralize. Right Rinne: AC > BC. Left Rinne: AC > BC. Nose: No nasal deformity, septal deviation, laceration, mucosal edema or rhinorrhea. Right Nostril: No epistaxis. Left Nostril: No epistaxis. Right Turbinates: Not enlarged.       Left laceration or lesion. Neurological:      Mental Status: He is alert and oriented to person, place, and time. Psychiatric:         Speech: Speech normal.         Behavior: Behavior normal.     See scanned audiogram dated 5/5/2022 for results.         Assessment:       Diagnosis Orders   1. Seasonal allergies  EPINEPHrine (AUVI-Q) 0.3 MG/0.3ML SOAJ injection    ME INJECTION,THERAP/PROPH/DIAGNOST, IM OR SUBCUT   2. Immunotherapy  EPINEPHrine (AUVI-Q) 0.3 MG/0.3ML SOAJ injection    ME INJECTION,THERAP/PROPH/DIAGNOST, IM OR SUBCUT   3. Sensorineural hearing loss (SNHL) of both ears             Plan:      Plan on allergy shots. AuviQ ordered. Follow up in 6 months. The patient was counseled for allergies and immunotherapy and received a AuviQ prescription medication(s) for this diagnosis. The mechanism of action for the prescription(s) were explained/reviewed. The appropriate usage was described and technique for topical use explained if appropriate. Questions from the patient were answered and the prescription(s) were e-prescribed to the appropriate pharmacy.           Pepe Sumner MD

## 2022-05-26 NOTE — PROGRESS NOTES
Allergy Testing Note        After obtaining consent, and per orders of Dr Cr Grijalva, injections of allergy serum given per documentation below by DAPHNE Winslow RN. Test Information  Consent: Yes  Time Antigens Placed: 0902  Location: Arm (left)  Allergen : ALK Iveth  Testing Nurse: DAPHNE Winslow RN  Reviewing Physician: Jeramy  Amount Injected (mL): 0.01    Controls  Negative 0.05% Glycerine-Saline: Not tested  Positive Histamine 1 mg/ml: Not tested    Jenny Ti: Not tested  Paper Wynetta Pih: Not tested  Angelica: 3+  Red Cochran: 3+  American Elm: 0  [place]: Not tested  Familia Graciela: Not tested  Sugar Maple: Not tested  [employer] Thousandsticks: 0  Bur [place]: Not tested  CoDa Therapeutics: Not tested  Sealed [employer] Corporation: Not tested  Cinematique: 3+  Black [place]: Not tested  Familia Belfry: Not tested  [employer]: 4+  [employer] Birch: 0  [place] Eastern: 3+  [place]/Pecan Mix: 0  Red Maple: 0  White Oak: 0    Others  [employer] Mite: 3+  Dog Dander: 3+  Cat Dander: 3+  Feather (Mixed): 0  Cockroach: 3+    Grasses  Ky Bluegrass: Not tested  Smooth [place]: Not tested  [place]: Not tested  [place]'s Pride Fescue: Not tested  [place] Pride Fescue: 4+ (EP 6)  Lux[place]bourg [place]: Not tested  Albert Spies: 4+  Bermuda: 4+  [place]: 4+ (EP 6)    Weeds  Cocklebur: Not tested  Lamb's Quarter: 0  Burweed Common: Not tested  Mugwort: Not tested  English Plantain: 3+  Giant Ragweed: Not tested  Short Ragweed: Not tested  Sheep Sorrel: 3+  Kochia: 0  Red Root Pigweed: 0 (ROUGH PIGWEED-NEGATIVE)  Mixed Ragweed: 3+    Molds/Fungi  Alternaria Hormodendrum: 4+  Dreschlera: Not tested  Epicoccum Nigrum: 3+  Epidermorphyto Floccosum: Not tested  Fusarium Moniliforme: Not tested  Fusarium Solani: Not tested  Geotrichum Candidum: Not tested  Gliocladium: Not tested  Deliquescence: Not tested  Spondylocladium: Not tested  Atrovirens Microsporum: Not tested  Phoma Betae: Not tested  Rhizopus Oryzae: Not tested  Rhodotorula: 3+  Saccharomyces Cerevisiae : Not tested  Stemphylium Solani: Not tested  Trichoderma Viride: Not tested  Trichophyton Mentagrophytes: Not tested  Cephalothecium Roseum: Not tested  Acremonium Strictum: Not tested  Aspergillus Flavus: Not tested  Aspergillus Fumigatus: 0  Aspergillus [place]: Not tested  Aureobasidium Pullulans: 0  Bipolaris Sorokiniana: 4+  Candida Albicans: 3+  Chaetomium Glosbosum: Not tested  Cladosporium Cladosporioides: 3+  Gibberella Pulicaris: 3+  Mucor Plumbeus: 3+  Penniccillum Notatum: 0    Testing was completed today. Please view results in the media tab. Patient has many allergies noted. Recommend Allergy Immunotherapy Injections. Patient is scheduled for allergy testing f/u appt with MD immediately following testing. Olga Powell  instructed to remain in clinic for 30 minutes post testing and to report any adverse reactions immediately. No changes noted in patient status post testing.

## 2022-06-06 ENCOUNTER — TELEPHONE (OUTPATIENT)
Dept: ENT CLINIC | Age: 37
End: 2022-06-06

## 2022-06-06 NOTE — TELEPHONE ENCOUNTER
Patient returned phone call to me and stated that at this time he is unsure if he wants to pursue Allergy Immunotherapy or not. He will call our office back when he decides.

## 2022-06-06 NOTE — TELEPHONE ENCOUNTER
Messages left for patient on 6/2/22 and today 6/6/22 for patient to let our office know if he would like allergy immunotherapy or not. Will await call back.

## 2022-12-05 RX ORDER — ADAPALENE AND BENZOYL PEROXIDE 3; 25 MG/G; MG/G
GEL TOPICAL
Qty: 45 G | Refills: 0 | Status: SHIPPED | OUTPATIENT
Start: 2022-12-05

## 2023-01-11 ENCOUNTER — TELEPHONE (OUTPATIENT)
Dept: FAMILY MEDICINE CLINIC | Age: 38
End: 2023-01-11

## 2023-01-11 DIAGNOSIS — Z00.00 ROUTINE GENERAL MEDICAL EXAMINATION AT A HEALTH CARE FACILITY: Primary | ICD-10-CM

## 2023-01-11 NOTE — TELEPHONE ENCOUNTER
----- Message from Nanda Elder sent at 1/11/2023  4:02 PM EST -----  Subject: Referral Request    Reason for referral request? Patient has an appt for a physical in April   and is requesting the same order (expect the blood type) he had done last   year around this time  Provider patient wants to be referred to(if known):     Provider Phone Number(if known): Additional Information for Provider?   ---------------------------------------------------------------------------  --------------  [address] [facility]    [phone];  Do not leave any message, patient will call back for answer  ---------------------------------------------------------------------------  --------------

## 2023-01-12 ENCOUNTER — OFFICE VISIT (OUTPATIENT)
Dept: DERMATOLOGY | Age: 38
End: 2023-01-12
Payer: COMMERCIAL

## 2023-01-12 DIAGNOSIS — L64.9 MALE PATTERN ALOPECIA: Primary | ICD-10-CM

## 2023-01-12 DIAGNOSIS — D22.9 MULTIPLE NEVI: ICD-10-CM

## 2023-01-12 DIAGNOSIS — L70.9 ADULT ACNE: ICD-10-CM

## 2023-01-12 PROCEDURE — 99214 OFFICE O/P EST MOD 30 MIN: CPT | Performed by: DERMATOLOGY

## 2023-01-12 RX ORDER — FINASTERIDE 1 MG/1
1 TABLET, FILM COATED ORAL DAILY
Qty: 90 TABLET | Refills: 3 | Status: SHIPPED | OUTPATIENT
Start: 2023-01-12 | End: 2023-01-12 | Stop reason: SDUPTHER

## 2023-01-12 RX ORDER — FINASTERIDE 1 MG/1
1 TABLET, FILM COATED ORAL DAILY
Qty: 90 TABLET | Refills: 3 | Status: SHIPPED | OUTPATIENT
Start: 2023-01-12

## 2023-01-12 RX ORDER — FINASTERIDE 1 MG/1
TABLET, FILM COATED ORAL
COMMUNITY
Start: 2022-08-15 | End: 2023-01-12 | Stop reason: SDUPTHER

## 2023-01-12 NOTE — PROGRESS NOTES
[facility]  Chel Pugh Mogardenia  1985    44 y.o. male     Date of Visit: 2023    Chief Complaint: hair loss, acne, nevi    History of Present Illness:    1. He returns today to follow-up for chronic male pattern alopecia. He has been taking finasteride 1 mg daily for years with good control. 2.  Follow up for chronic adult acne - reports good control with use of Epiduo forte gel. 3.  He has multiple nevi - not aware of any changes in size, color or shape. Derm Hx:     He has a history of a superficial basal cell carcinoma on the posterior aspect the right lower leg-treated with curettage on 2018. Wears sunscreen regularly. Paternal grandfather had melanoma ( from it in his 45s). Review of Systems:  : no sexual SE. Psych: no depression. Past Medical History, Family History, Surgical History, Medications and Allergies reviewed. Past Medical History:   Diagnosis Date    Allergic rhinitis      Past Surgical History:   Procedure Laterality Date    VARICOCELECTOMY         Allergies   Allergen Reactions    Amoxicillin Rash     Outpatient Medications Marked as Taking for the 23 encounter (Office Visit) with Nicolas Zelaya MD   Medication Sig Dispense Refill    finasteride (PROPECIA) 1 MG tablet Take 1 tablet by mouth daily 90 tablet 3    Adapalene-Benzoyl Peroxide (EPIDUO FORTE) 0.3-2.5 % GEL APPLY TO FACE ONCE DAILY FOR ACNE 45 g 0    azelastine (ASTELIN) 0.1 % nasal spray 2 sprays by Nasal route 2 times daily Use in each nostril as directed 1 each 5    fluticasone (FLONASE) 50 MCG/ACT nasal spray 1 spray by Nasal route daily 1 each 5    amphetamine-dextroamphetamine (ADDERALL) 20 MG tablet Take 10 mg by mouth daily.  Patient takes 5-10mg a few times weekly      Multiple Vitamins-Minerals (MULTIVITAMIN ADULT PO) Take 1 tablet by mouth daily             Physical Examination       The following were examined and determined to be normal: Psych/Neuro, Head/face, Conjunctivae/eyelids, Gums/teeth/lips, Neck, Breast/axilla/chest, Abdomen, Back, RUE, LUE, RLE, LLE, and Nails/digits. The following were examined and determined to be abnormal: Scalp/hair. Well appearing. 1.  Crown > vertex scalp with thinning of the hair. 2.  Clear. 3.  Trunk and extremities with multiple well defined round to oval smooth brown macules and papules. Assessment and Plan     1. Male pattern alopecia - stable    Continue finasteride 1 mg daily. Add minoxidil 5% solution or foam twice daily for involvement of the crown of the scalp. 2. Adult acne - under good control    Continue Epiduo Forte gel daily as needed. 3. Multiple nevi - benign appearing    Sun protective behaviors, including use of at least SPF 30 sunscreen, and self skin examinations were encouraged. Call for any new or concerning lesions. Return in about 1 year (around 1/12/2024).     --Jessica Gold MD

## 2023-01-17 ENCOUNTER — OFFICE VISIT (OUTPATIENT)
Dept: ENT CLINIC | Age: 38
End: 2023-01-17
Payer: COMMERCIAL

## 2023-01-17 ENCOUNTER — PROCEDURE VISIT (OUTPATIENT)
Dept: AUDIOLOGY | Age: 38
End: 2023-01-17

## 2023-01-17 VITALS
BODY MASS INDEX: 25.69 KG/M2 | TEMPERATURE: 98.1 F | HEIGHT: 76 IN | DIASTOLIC BLOOD PRESSURE: 73 MMHG | HEART RATE: 73 BPM | SYSTOLIC BLOOD PRESSURE: 115 MMHG | WEIGHT: 211 LBS

## 2023-01-17 DIAGNOSIS — H91.91 HEARING LOSS OF RIGHT EAR, UNSPECIFIED HEARING LOSS TYPE: Primary | ICD-10-CM

## 2023-01-17 DIAGNOSIS — H90.A22 SENSORINEURAL HEARING LOSS (SNHL) OF LEFT EAR WITH RESTRICTED HEARING OF RIGHT EAR: ICD-10-CM

## 2023-01-17 DIAGNOSIS — H90.3 SENSORINEURAL HEARING LOSS, BILATERAL: Primary | ICD-10-CM

## 2023-01-17 DIAGNOSIS — J30.2 SEASONAL ALLERGIES: Primary | ICD-10-CM

## 2023-01-17 DIAGNOSIS — H93.8X3 EAR PRESSURE, BILATERAL: ICD-10-CM

## 2023-01-17 DIAGNOSIS — H90.3 SENSORINEURAL HEARING LOSS (SNHL) OF BOTH EARS: ICD-10-CM

## 2023-01-17 DIAGNOSIS — H91.22 SUDDEN HEARING LOSS, LEFT: ICD-10-CM

## 2023-01-17 DIAGNOSIS — H93.13 TINNITUS, BILATERAL: ICD-10-CM

## 2023-01-17 PROCEDURE — 99214 OFFICE O/P EST MOD 30 MIN: CPT | Performed by: OTOLARYNGOLOGY

## 2023-01-17 RX ORDER — PREDNISONE 10 MG/1
TABLET ORAL
Qty: 38 TABLET | Refills: 0 | Status: SHIPPED | OUTPATIENT
Start: 2023-01-17

## 2023-01-17 ASSESSMENT — ENCOUNTER SYMPTOMS
DIARRHEA: 0
EYE REDNESS: 0
VOICE CHANGE: 0
EYE ITCHING: 0
CHOKING: 0
SINUS PRESSURE: 0
FACIAL SWELLING: 0
COUGH: 0
RHINORRHEA: 0
EYE PAIN: 0
NAUSEA: 0
SINUS PAIN: 0
TROUBLE SWALLOWING: 0
SHORTNESS OF BREATH: 0
SORE THROAT: 0

## 2023-01-17 NOTE — PROGRESS NOTES
Subjective:      Patient ID: Jaswant Lai is a 44 y.o. male. HPI  Hearing Loss HPI  CC: hearing loss and allergies    General: History of Present Illness:Mauri is a(n) 39 y.o. male who presents with a liflong history of allergies. Head pressure and congestion. Right ear pressure and muffled hearing. Had shots as kid. Not sure if helped. Moved to [place] then back Worse now. Had audiogram in 5-2022. Bilateral SNHL. Had allergy testing. Pan-atopic. No shots as of today. Hasd sudden loss in December. More pressure. Increased tinnitus. Todays audiogram with worsening hearing and word discrimination left ear. Stable right. No vertigo or otorrhea.    History of hearing loss: Yes      Patient Active Problem List   Diagnosis    Allergic rhinitis    Male pattern alopecia    Raynaud's phenomenon without gangrene    Seasonal allergies     Past Surgical History:   Procedure Laterality Date    VARICOCELECTOMY       Family History   Problem Relation Age of Onset    Cancer Paternal Grandfather         melanoma     Social History     Socioeconomic History    Marital status:      Spouse name: Not on file    Number of children: Not on file    Years of education: Not on file    Highest education level: Not on file   Occupational History    Not on file   Tobacco Use    Smoking status: Former     Types: Cigars    Smokeless tobacco: Never   Substance and Sexual Activity    Alcohol use: Yes     Comment: socially    Drug use: Not on file    Sexual activity: Not on file   Other Topics Concern    Not on file   Social History Narrative    Not on file     Social Determinants of Health     Financial Resource Strain: Low Risk     Difficulty of Paying Living Expenses: Not hard at all   Food Insecurity: No Food Insecurity    Worried About Running Out of Food in the Last Year: Never true    [address] in the Last Year: Never true   Transportation Needs: No Transportation Needs    Lack of Transportation (Medical): No    Lack of Transportation (Non-Medical): No   Physical Activity: Not on file   Stress: Not on file   Social Connections: Not on file   Intimate Partner Violence: Not on file   Housing Stability: Not on file       DRUG/FOOD ALLERGIES: Amoxicillin    CURRENT MEDICATIONS  Prior to Admission medications    Medication Sig Start Date End Date Taking? Authorizing Provider   finasteride (PROPECIA) 1 MG tablet Take 1 tablet by mouth daily 1/12/23   Silvana Carmen MD   Adapalene-Benzoyl Peroxide (EPIDUO FORTE) 0.3-2.5 % GEL APPLY TO FACE ONCE DAILY FOR ACNE 12/5/22   Silvana Carmen MD   azelastine (ASTELIN) 0.1 % nasal spray 2 sprays by Nasal route 2 times daily Use in each nostril as directed 5/3/22 5/3/23  Floyd Lopez MD   fluticasone [facility]) 50 MCG/ACT nasal spray 1 spray by Nasal route daily 5/3/22 1/12/23  Floyd Lopez MD   amphetamine-dextroamphetamine (ADDERALL) 20 MG tablet Take 10 mg by mouth daily. Patient takes 5-10mg a few times weekly    Historical Provider, MD   Multiple Vitamins-Minerals (MULTIVITAMIN ADULT PO) Take 1 tablet by mouth daily    Historical Provider, MD       Lab Studies:  Lab Results   Component Value Date    WBC 5.3 05/04/2022    HGB 15.1 05/04/2022    HCT 42.5 05/04/2022    MCV 92.2 05/04/2022     (L) 05/04/2022     Lab Results   Component Value Date    GLUCOSE 101 (H) 05/04/2022    BUN 16 05/04/2022    CREATININE 1.0 05/04/2022    K 4.2 05/04/2022     05/04/2022    CL 99 05/04/2022    CALCIUM 9.5 05/04/2022     No results found for: MG  No results found for: PHOS  Lab Results   Component Value Date    ALKPHOS 44 05/04/2022    ALT 16 05/04/2022    AST 30 05/04/2022    BILITOT 1.8 (H) 05/04/2022    PROT 7.2 05/04/2022      Review of Systems   Constitutional:  Negative for activity change, appetite change, chills, fatigue and fever. HENT:  Positive for hearing loss.  Negative for congestion, ear discharge, ear pain, facial swelling, nosebleeds, postnasal drip, rhinorrhea, sinus pressure, sinus pain, sneezing, sore throat, tinnitus, trouble swallowing and voice change. Eyes:  Negative for pain, redness, itching and visual disturbance. Respiratory:  Negative for cough, choking and shortness of breath. Gastrointestinal:  Negative for diarrhea and nausea. Endocrine: Negative for cold intolerance and heat intolerance. Objective:   Physical Exam  Constitutional:       General: He is not in acute distress. Appearance: He is well-developed. HENT:      Head: Not macrocephalic and not microcephalic. No Mata's sign, abrasion, contusion, right periorbital erythema, left periorbital erythema or laceration. Right Ear: Hearing, tympanic membrane, ear canal and external ear normal. No decreased hearing noted. No laceration, drainage, swelling or tenderness. No middle ear effusion. No foreign body. No mastoid tenderness. No hemotympanum. Tympanic membrane is not injected, perforated, retracted or bulging. Tympanic membrane has normal mobility. Left Ear: Hearing, tympanic membrane, ear canal and external ear normal. No decreased hearing noted. No laceration, drainage, swelling or tenderness. No middle ear effusion. No foreign body. No mastoid tenderness. No hemotympanum. Tympanic membrane is not injected, perforated, retracted or bulging. Tympanic membrane has normal mobility. Ears:      Shen exam findings: Does not lateralize. Right Rinne: AC > BC. Left Rinne: AC > BC. Nose: No nasal deformity, septal deviation, laceration, mucosal edema or rhinorrhea. Right Nostril: No epistaxis or occlusion. Left Nostril: No epistaxis or occlusion. Right Turbinates: Not enlarged, swollen or pale. Left Turbinates: Not enlarged, swollen or pale. Right Sinus: No maxillary sinus tenderness or frontal sinus tenderness. Left Sinus: No maxillary sinus tenderness or frontal sinus tenderness.       Mouth/Throat:      Lips: No lesions. Mouth: Mucous membranes are moist.      Tongue: No lesions. Palate: No mass. Pharynx: Uvula midline. No oropharyngeal exudate or posterior oropharyngeal erythema. Tonsils: No tonsillar abscesses. Eyes:      General:         Right eye: No discharge. Left eye: No discharge. Extraocular Movements:      Right eye: Normal extraocular motion and no nystagmus. Left eye: Normal extraocular motion and no nystagmus. Conjunctiva/sclera:      Right eye: Right conjunctiva is not injected. No chemosis or exudate. Left eye: Left conjunctiva is not injected. No chemosis or exudate. Pupils:      Right eye: Pupil is reactive. Left eye: Pupil is reactive. Neck:      Thyroid: No thyroid mass or thyromegaly. Cardiovascular:      Rate and Rhythm: Normal rate and regular rhythm. Pulmonary:      Effort: No tachypnea or respiratory distress. Musculoskeletal:      Cervical back: Normal range of motion and neck supple. Lymphadenopathy:      Head:      Right side of head: No preauricular, posterior auricular or occipital adenopathy. Left side of head: No preauricular, posterior auricular or occipital adenopathy. Cervical:      Right cervical: No superficial, deep or posterior cervical adenopathy. Left cervical: No superficial, deep or posterior cervical adenopathy. Skin:     Findings: No bruising, erythema or lesion. Neurological:      Mental Status: He is alert and oriented to person, place, and time. Psychiatric:         Attention and Perception: Attention normal.         Mood and Affect: Mood normal.         Speech: Speech normal.       Assessment:       Diagnosis Orders   1. Seasonal allergies        2. Sensorineural hearing loss (SNHL) of left ear with restricted hearing of right ear  MRI IAC POSTERIOR FOSSA W WO CONTRAST    predniSONE (DELTASONE) 10 MG tablet      3.  Sudden hearing loss, left  predniSONE (DELTASONE) 10 MG tablet Plan:      Ordered and interpreted audiogram.   Ordered Rx prednisone  Ordered MRI IAC. Call with results. The patient was counseled for sudden hearing loss and received a prednisone prescription medication(s) for this diagnosis. The mechanism of action for the prescription(s) were explained/reviewed. The appropriate usage was described and technique for topical use explained if appropriate. Questions from the patient were answered and the prescription(s) were e-prescribed to the appropriate pharmacy.           Jo Cunningham MD

## 2023-01-17 NOTE — PATIENT INSTRUCTIONS
Good Communication Strategies    Communication can be challenging for anyone, but can be especially difficult for those with some degree of hearing loss. While we may not be able to control every factor that may lead to difficulty with communication, there are Good Communication Strategies that we can all use in our day-to-day lives. Communication takes both parties working together for it to be successful. Tips as a Listener:   Control your environment. It is important to limit the amount of background noise in the room when possible. You should also consider having a good light source in the room to best see the other person. Ask for clarification. Instead of saying \"What?\", you can use parts of what you heard to make a new question. For example, if you heard the word \"Thursday\" but not the rest of the week, you may ask \"What was that about Thursday? \" or \"What did you want to do Thursday? \". This shows the person talking that you are listening and will help them better explain what they are saying. Be an advocate for yourself. If you are hearing but not understanding, tell the other person \"I can hear you, but I need you to slow down when you speak. \"  Or if someone is facing the other direction, say \"I cannot hear you when you are not looking at me when we talk. \"       Tips as a Talker:   - Sit or stand 3 to 6 feet away to maximize audibility         -- It is unrealistic to believe someone else will fully hear your message if you are speaking from across the room or in a different room in the house   - Stay at eye level to help with visual cues   - Make sure you have the persons attention before speaking   - Use facial expressions and gestures to accentuate your message   - Raise your voice slightly (do not scream)   - Speak slowly and distinctly   - Use short, simple sentences   - Rephrase your words if the person is having a hard time understanding you    - To avoid distortion, dont speak directly into a persons ear      Some additional items that may be helpful:   - Remain patient - this is important for both parties   - Write down items that still cannot be heard/understood. You may write with pen/paper or consider typing/texting on a cell phone or smart device. - If background noise is unavoidable, try to keep yourself in a good position in the room. By sitting at a zhou on the side of the restaurant (preferably a corner), it will be easier to communicate than if you were sitting at a table in the middle with background noise surrounding you. Keep yourself positioned away from music speakers or heavy foot traffic.   - If you have difficulty with the television, consider these options:      -- Use closed-captioning, which is a setting you can turn on that displays the spoken words in a written form on the screen. There may be a slight delay, but this can help fill in missing information. This can be especially helpful when watching programs with accented speech. -- Consider use of a sound bar or speakers that come from the front of the TV. With modern flat screen TVs, many of them have speakers that come out of the back of the device, which makes sound bounce off the wall behind it, then go into the room. Sound bars can allow the sound to go straight in your direction and can improve sound quality. -- Consider ear level devices to help improve the volume and/or sound quality of the program.  There are devices that work like headphones that you can adjust the volume for your ears while others can have the volume at a more comfortable level, such as \"TV Ears\". Most hearing aids have devices that allow them to connect directly to the TV and improve sound quality. Tinnitus: Overview and Management Strategies          Many people have some ringing sounds in their ears once in a while. You may hear a roar, a hiss, a tinkle, or a buzz. The sound usually lasts only a few minutes.  If it goes on all the time, you may have tinnitus. Tinnitus is usually caused by long-term exposure to loud noise. This damages the nerves in the inner ear. It can occur with all types of hearing loss. It may be a symptom of almost any ear problem. Tinnitus may be caused by a buildup of earwax. Or, it may be caused by ear infections or certain medicines (especially antibiotics or large amounts of aspirin). You can also hear noises in your ears because of an injury to the ears, drinking too much alcohol or caffeine, or a medical condition. Other conditions may also contribute to tinnitus, including: head and neck trauma, temporomandibular joint disorder (TMJ), sinus pressure and barometric trauma, traumatic brain injury, metabolic disorders, autoimmune disorders, stress, and high blood pressure. You may need tests to evaluate your hearing and to find causes of long-lasting tinnitus. Your doctor may suggest one or more treatments to help you cope with the tinnitus. You can also do things at home to help reduce symptoms. Follow-up care is a key part of your treatment and safety. Be sure to make and go to all appointments, and call your doctor if you are having problems. It's also a good idea to know your test results and keep a list of the medicines you take. How can you care for yourself at home? Limit or cut out alcohol, caffeine, and sodium. They can make your symptoms worse. Do not smoke or use other tobacco products. Nicotine reduces blood flow to the ear and makes tinnitus worse. If you need help quitting, talk to your doctor about stop-smoking programs and medicines. These can increase your chances of quitting for good. Talk to your doctor about whether to stop taking aspirin and similar products such as ibuprofen or naproxen. Get exercise often. It can help improve blood flow to the ear. Ways to manage/cope with tinnitus  Some tinnitus may last a long time.  To manage your tinnitus, try to:  Avoid noises that you think caused your tinnitus. If you can't avoid loud noises, wear earplugs or earmuffs. Ignore the sound by paying attention to other things. Keeping your brain busy with other tasks or background noise can help your brain not focus on the tinnitus. Try to not give the tinnitus an emotional reaction. Do your best to ignore the sound and not let it bother you. Relax using biofeedback, meditation, or yoga. Feeling stressed and being tired can make tinnitus worse. Play music or white noise to help you sleep. Background noise may cover up the noise that you hear in your ears. You can buy a tabletop machine or a device that sits under your pillow to play soothing sounds, like ocean waves. Smart phones have free apps, such as Whist, Relax Melodies, ReSound Relief, and [employer]. These apps have different types of sounds/noise, some of which you can blend together to find sounds that are most soothing to you. Hearing aid technology, especially when there is some hearing loss, may help reduce tinnitus symptoms by giving your brain better access to the sounds it is missing. There are some hearing aids with built-in noise generator programs, which may help when amplification alone is not enough. Additional resources may be found through the American Tinnitus Association at www.hong.org    When should you call for help? Call 911 anytime you think you may need emergency care. For example, call if:    You have symptoms of a stroke. These may include:  Sudden numbness, tingling, weakness, or loss of movement in your face, arm, or leg, especially on only one side of your body. Sudden vision changes. Sudden trouble speaking. Sudden confusion or trouble understanding simple statements. Sudden problems with walking or balance. A sudden, severe headache that is different from past headaches. Call your doctor now or seek immediate medical care if:    You develop other symptoms. These may include hearing loss (or worse hearing loss), balance problems, dizziness, nausea, or vomiting. Watch closely for changes in your health, and be sure to contact your doctor if:    Your tinnitus moves from both ears to one ear. Your hearing loss gets worse within 1 day after an ear injury. Your tinnitus or hearing loss does not get better within 1 week after an ear injury. Your tinnitus bothers you enough that you want to take medicines to help you cope with it. If you notice changes in your tinnitus and/or your hearing, it is recommended that you have your hearing tested by your audiologist and to follow-up with your physician that manages your hearing loss (such as your ENT or Primary Care doctor). Hearing Loss: Care Instructions  Your Care Instructions      Hearing loss is a sudden or slow decrease in how well you hear. It can range from mild to profound. Permanent hearing loss can occur with aging, and it can happen when you are exposed long-term to loud noise. Examples include listening to loud music, riding motorcycles, or being around other loud machines. Hearing loss can affect your work and home life. It can make you feel lonely or depressed. You may feel that you have lost your independence. But hearing aids and other devices can help you hear better and feel connected to others. Follow-up care is a key part of your treatment and safety. Be sure to make and go to all appointments, and call your doctor if you are having problems. It's also a good idea to know your test results and keep a list of the medicines you take. How can you care for yourself at home? Avoid loud noises whenever possible. This helps keep your hearing from getting worse. Always wear hearing protection around loud noises. If appropriate, wear hearing aid(s) as directed.   It is recommended that hearing aids are worn during all waking hours to keep your brain active and give it access to the sounds it is missing. If you are beginning your process with hearing aid(s), schedule a \"Hearing Aid Evaluation\" with an audiologist to discuss your lifestyle, features of hearing aid technology, and styles of hearing aids available. It is recommended that you contact your insurance company to determine if you have a hearing aid benefit, as this may dictate who you can see for these services. Have hearing tests as your doctor suggests. They can show whether your hearing has changed. Your hearing aid may need to be adjusted. Use other assistive devices as needed. These may include:  Telephone amplifiers and hearing aids that can connect to a television, stereo, radio, or microphone. Devices that use lights or vibrations. These alert you to the doorbell, a ringing telephone, or a baby monitor. Television closed-captioning. This shows the words at the bottom of the screen. Most new TVs can do this. TTY (text telephone). This lets you type messages back and forth on the telephone instead of talking or listening. These devices are also called TDD. When messages are typed on the keyboard, they are sent over the phone line to a receiving TTY. The message is shown on a monitor. Use pagers, fax machines, text, and email if it is hard for you to communicate by telephone. Try to learn a listening technique called speech-reading. It is not lip-reading. You pay attention to people's gestures, expressions, posture, and tone of voice. These clues can help you understand what a person is saying. Face the person you are talking to, and have him or her face you. Make sure the lighting is good. You need to see the other person's face clearly. Think about counseling if you need help to adjust to your hearing loss. When should you call for help? Watch closely for changes in your health, and be sure to contact your doctor if:    You think your hearing is getting worse.      You have new symptoms, such as dizziness or nausea. Noise-Induced Hearing Loss  What it is, and what you can do to prevent it    Exposure to loud sounds, in an occupational setting or recreational, can cause permanent hearing loss. Sound is measured in decibels (dB). Noise-induced hearing loss is the ONLY type of preventable hearing loss. Hearing loss related to noise exposure can occur at any age. There are small sensory cells, called inner and outer hair cells, within the inner ear (cochlea). These cells process the loudness (intensity) and pitch (frequency) of sound and send the signal to the brain via our auditory nerve (vestibulocochlear nerve, cranial nerve VIII). When these cells are damaged, they can result in permanent hearing loss and/or tinnitus. The hair cells responsible for high frequency sounds, like birds chirping, are most likely to be damaged due to loud sounds. The high frequency sounds are also very important for our clarity and understanding of speech. OCCUPATIONAL NOISE EXPOSURE RECREATIONAL NOISE EXPOSURE   Some jobs may have exposure to loud sounds in the workplace. These jobs may include but are not limited to:  [address] [place] [address] Street settings  Manufacturing  Construction  Welding  Landscaping  Hairdressing/hairstyling  [facility]   . .. And more! Many activities outside of work may cause permanent hearing loss. These activities may include but are not limited to:  Lawnmowers, leaf blowers  Farming equipment and animals (such as pigs squealing)  Chainsaws and other power tools  Playing musical instruments and/or singing  Listening to music too loudly - at concerts, through stereo, through ear buds or headphones  Attending sporting events  Attending fireworks shows or using fireworks at home  Use of firearms  . .. And more! REDUCE OR PROTECT YOUR EARS FROM NOISE EXPOSURE    To do your best to avoid noise-induced hearing loss, here are some tips:  Limit exposure to loud sounds.   85 dB (decibels) is safe for 8 hours. As sounds are louder, the length of time the sound is safe lessens. These numbers are cumulative across a 24-hour period. (NIOSH and CDC, 2002)  85 dB is safe for 8 hours  88 dB is safe for 4 hours  91 dB is safe for 2 hours  94 dB is safe for 1 hour  97 dB is safe for 30 minutes  100 dB is safe for 15 minutes  103 dB is safe for 7.5 minutes  106 dB is safe for 3.75 minutes  109 dB is safe for LESS THAN 2 minutes  112 dB is safe for LESS THAN 1 minute  115 dB is safe for ~ 30 seconds  130 dB can cause IMMEDIATE hearing loss  If you are unsure if a sound is too loud, consider checking the sound level with a \"sound level meter\". There are apps on smart devices, such as \"Decibel X\", that can measure the loudness of the sound. They are not as accurate as expensive equipment used by scientists, but it will give you a guesstimate of how loud the sound is, and if it may be damaging to your hearing. If you cannot avoid loud sounds, here are ways to reduce your exposure:  1. Wear hearing protection  Ear plugs and protective ear muffs can be used to reduce the intensity of the sound. The higher the NRR (noise reduction rating), the better reduction of the intensity of the sound   2. Turn the volume down  When listening to music, turn the volume down, especially when wearing ear buds or headphones. A good rule of thumb is to not go beyond the middle setting on your device. If you can't hear someone talking to you from arm's length away, your music may be at a level that it can cause damage. If someone else can hear your music from 3 feet away, it may also be at a level that it can cause damage. 3. Walk away from the sound  If you do not have the ability to wear hearing protection or turn down the volume of the sound, you should do your best to move away from the source of the sound. Sound decreases in intensity as we move further from the source.  The sound will decrease by 6 dB for every doubling of distance from the sound source. TYPES OF HEARING PROTECTION    The most common types of hearing protection are protective ear muffs and ear plugs. Protective ear muffs are commonly found at home improvement or sporting good stores, they can be worn time and time again and are great if you need to take your hearing protection off frequently. Ear plugs are often made of foam or soft silicone. The foam ones are designed for one-time use, while silicone ear plugs may be used multiple times. There are also \"filtered\" ear plugs that help provide even attenuation of the sound across all frequencies. These are great for listening to music or going to concerts, and allow for better understanding of speech in louder environments. They can be purchased at music stores or online retailers (search \"Ety Plugs\" or \"filtered ear plugs\"), or custom earmolds can be made with an audiologist.    There are \"custom\" hearing protection devices that you can further discuss with your audiologist based on your specific needs, if desired. Exposure to these sounds may cause permanent damage to your hearing.   If you suspect your hearing has changed, it is recommended that you have your hearing tested by your audiologist.

## 2023-01-17 NOTE — Clinical Note
Dr. Jorge Pearson,  Please see note from this patient's audiogram.  Please let me know if there is anything further you need.    Bhargav Guerra [address], [address] Audiologist

## 2023-01-17 NOTE — PROGRESS NOTES
Erika Bailon   1985, 44 y.o. male   [id number]       Referring Provider: Rebeca Coleman MD, PhD  Referral Type: In an order in [address] [place] [address]    Reason for Visit: Evaluation of suspected change in hearing, tinnitus, or balance. ADULT AUDIOLOGIC EVALUATION      Erika Bailon is a 44 y.o. male seen today, 1/17/2023, for a recheck audiologic evaluation. AUDIOLOGIC AND OTHER PERTINENT MEDICAL HISTORY:        Erika Bailon noted decreased hearing in left ear, onset early December 2022, sudden onset with increased tinnitus. Erika Bailon denied otalgia, otorrhea, and dizziness. IMPRESSIONS:       Today's results are consistent with bilateral sensorineural hearing loss with decrease in left ear hearing in mid frequencies; right with excellent word recognition and left ear with good word recognition; both ears with hypermobile TMs. Hearing loss is significant enough to result in difficulty understanding speech in most listening environments. Follow medical recommendations from Dr. Josselyn Landeros and consider amplification, pending medical clearance. ASSESSMENT AND FINDINGS:       Otoscopy revealed: Clear ear canals bilaterally      RIGHT EAR:  Hearing Sensitivity: Within normal limts through 1500 Hz sloping to mild sensorineural hearing loss 3171-8650 Hz, then rising to within noraml limits. Speech Recognition Threshold: 15 dBHL  Word Recognition: Excellent (100%), based on NU-6 25-word list at 50 dBHL using recorded speech stimuli. Tympanometry: Normal peak pressure with high compliance, Type Ad tympanogram, consistent with hypermobile tympanic membrane. COMPARISON TO PREVIOUS TESTING COMPLETED on 5/5/2022 at [facility]: Stable hearing sensitivity and word recognition. LEFT EAR:  Hearing Sensitivity: Within normal limits through 500 Hz precipiitously sloping to moderately-severe 6472-6381 Hz, then rising to mild essentially sensorineural hearing loss.   Speech Recognition Threshold: 35 dBHL  Word Recognition: Good (88%), based on NU-6 25-word list at 80 dBHL, masked, using recorded speech stimuli.    Tympanometry: Normal peak pressure with high compliance, Type Ad tympanogram, consistent with hypermobile tympanic membrane.    COMPARISON TO PREVIOUS TESTING COMPLETED on 5/5/2022 at [facility]: Stable at 250 Hz and 0046-5004 Hz with a decrease of 10-40 dBHL 500-4000 Hz; word recognition is slightly decreased but not a clinically significant difference.     NOTE: An asymmetry of 25-50 dBHL is present 750-8000 Hz with left ear worse than right ear.  There is also a borderline clinically significant difference in word recognition with left ear (88%) worse than right ear (100%).    Reliability: Good  Transducer: Inserts    See scanned audiogram dated 1/17/2023 for results.      PATIENT EDUCATION:       The following items were discussed with the patient:   - Good Communication Strategies  - Hearing Loss and Hearing Aids  - Tinnitus Management Strategies    - Noise-Induced Hearing Loss and use of Hearing Protection Devices (HPDs)     Educational information was shared in the After Visit Summary.                                                RECOMMENDATIONS:                                                                                                                                                                                                                                                                      The following items are recommended based on patient report and results from today's appointment:   - Continue medical follow-up with Ti Deshpande MD, PhD.   - Retest hearing as medically indicated and/or sooner if a change in hearing is noted.  - If desired, schedule a Hearing Aid Evaluation (HAE) appointment to discuss hearing aid options.  - Utilize \"Good Communication Strategies\" as discussed to assist in speech understanding with communication partners.  - Maintain a sound enriched  environment to assist in the management of tinnitus symptoms.  - Use hearing protection devices (HPDs), such as protective ear muffs and ear plugs, when exposed to dangerous sound levels.          [address] [place]  Audiologist       Chart CC'd to: Lucille Charles MD, PhD      Degree of   Hearing Sensitivity dB Range   Within Normal Limits (WNL) 0 - 20   Mild 20 - 40   Moderate 40 - 55   Moderately-Severe 55 - 70   Severe 70 - 90   Profound 90 +

## 2023-01-23 ENCOUNTER — HOSPITAL ENCOUNTER (OUTPATIENT)
Dept: MRI IMAGING | Age: 38
Discharge: HOME | End: 2023-01-23
Payer: COMMERCIAL

## 2023-01-23 DIAGNOSIS — H90.A22 SENSORINEURAL HEARING LOSS (SNHL) OF LEFT EAR WITH RESTRICTED HEARING OF RIGHT EAR: ICD-10-CM

## 2023-01-23 PROCEDURE — A9576 INJ PROHANCE MULTIPACK: HCPCS | Performed by: OTOLARYNGOLOGY

## 2023-01-23 PROCEDURE — 70553 MRI BRAIN STEM W/O & W/DYE: CPT

## 2023-01-23 PROCEDURE — 6360000004 HC RX CONTRAST MEDICATION: Performed by: OTOLARYNGOLOGY

## 2023-01-23 RX ADMIN — GADOTERIDOL 20 ML: 279.3 INJECTION, SOLUTION INTRAVENOUS at 18:17

## 2023-01-24 ENCOUNTER — TELEPHONE (OUTPATIENT)
Dept: ENT CLINIC | Age: 38
End: 2023-01-24

## 2023-01-25 DIAGNOSIS — D33.3 ACOUSTIC NEUROMA (HCC): Primary | ICD-10-CM

## 2023-01-26 ENCOUNTER — TELEPHONE (OUTPATIENT)
Dept: FAMILY MEDICINE CLINIC | Age: 38
End: 2023-01-26

## 2023-01-26 DIAGNOSIS — D33.3 ACOUSTIC NEUROMA (HCC): Primary | ICD-10-CM

## 2023-01-26 NOTE — TELEPHONE ENCOUNTER
Patient called to give you an update on his hearing issue and wanted to know perspective on his next steps. 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 Patient would like to know how should he proceed?  Thanks [phone]

## 2023-01-27 ENCOUNTER — PATIENT MESSAGE (OUTPATIENT)
Dept: FAMILY MEDICINE CLINIC | Age: 38
End: 2023-01-27

## 2023-01-27 DIAGNOSIS — D33.3 ACOUSTIC NEUROMA (HCC): ICD-10-CM

## 2023-01-27 DIAGNOSIS — Z00.00 ROUTINE GENERAL MEDICAL EXAMINATION AT A HEALTH CARE FACILITY: Primary | ICD-10-CM

## 2023-02-08 ENCOUNTER — TELEPHONE (OUTPATIENT)
Dept: DERMATOLOGY | Age: 38
End: 2023-02-08

## 2023-02-08 NOTE — TELEPHONE ENCOUNTER
Pt calling states he would like to have a prescription for Finasteride 1 mg tablet be sent to [facility] phone # [phone] and fax # is [phone] pt would like to get a [employer] message as to once the prescription is sent pls call pt back @ [phone]

## 2023-02-09 RX ORDER — FINASTERIDE 1 MG/1
1 TABLET, FILM COATED ORAL DAILY
Qty: 90 TABLET | Refills: 3 | Status: SHIPPED | OUTPATIENT
Start: 2023-02-09

## 2023-02-10 NOTE — TELEPHONE ENCOUNTER
From: Dr. Corona Police  To: Aubree Horan  Sent: 1/27/2023 7:12 AM EST  Subject: follow up    Good morning  Thanks for the update. I reviewed all the testing and evaluation, and agree with the path/course of action. Whenever we see an asymmetric hearing loss, the MRI is the next step and I am glad they got that done, obviously not happy with the findings. I agree with seeing Dr. Mario Garcia, he is excellent and would be my first choice to get this evaluated. I have several patients who have these benign tumors and have done fantastic with/after surgery. Some chronic hearing loss is always a concern. ..    Let me know if you have further questions as you move forward    Jennifer Caal MD

## 2023-02-14 NOTE — TELEPHONE ENCOUNTER
My surgery is now scheduled for 4/6 at [address] [place] [address] with Dr. José Miguel Sarmiento. Ill make the necessary appointments for pre-testing etc. here shortly and will need your assistance adjusting the blood testing you already ordered so it reflects both the  new requirements along with the tests I previously requested during our last visit. Thanks! Pt request lab orders prior to pre op appt.    INR  PT  PTT  BMP  CBC

## 2023-03-10 DIAGNOSIS — Z00.00 ROUTINE GENERAL MEDICAL EXAMINATION AT A HEALTH CARE FACILITY: ICD-10-CM

## 2023-03-10 DIAGNOSIS — D33.3 ACOUSTIC NEUROMA (HCC): ICD-10-CM

## 2023-03-10 LAB
A/G RATIO: 2 (ref 1.1–2.2)
ALBUMIN SERPL-MCNC: 5 G/DL (ref 3.4–5)
ALP BLD-CCNC: 44 U/L (ref 40–129)
ALT SERPL-CCNC: 12 U/L (ref 10–40)
ANION GAP SERPL CALCULATED.3IONS-SCNC: 14 MMOL/L (ref 3–16)
APTT: 31 SEC (ref 23–34.3)
AST SERPL-CCNC: 24 U/L (ref 15–37)
BILIRUB SERPL-MCNC: 1.9 MG/DL (ref 0–1)
BUN BLDV-MCNC: 13 MG/DL (ref 7–20)
CALCIUM SERPL-MCNC: 9.9 MG/DL (ref 8.3–10.6)
CHLORIDE BLD-SCNC: 96 MMOL/L (ref 99–110)
CHOLESTEROL, TOTAL: 202 MG/DL (ref 0–199)
CO2: 25 MMOL/L (ref 21–32)
CREAT SERPL-MCNC: 0.9 MG/DL (ref 0.9–1.3)
GFR SERPL CREATININE-BSD FRML MDRD: >60 ML/MIN/{1.73_M2}
GLUCOSE BLD-MCNC: 91 MG/DL (ref 70–99)
HCT VFR BLD CALC: 43.5 % (ref 40.5–52.5)
HDLC SERPL-MCNC: 48 MG/DL (ref 40–60)
HEMOGLOBIN: 15.2 G/DL (ref 13.5–17.5)
INR BLD: 1.07 (ref 0.87–1.14)
LDL CHOLESTEROL CALCULATED: 135 MG/DL
MCH RBC QN AUTO: 32.1 PG (ref 26–34)
MCHC RBC AUTO-ENTMCNC: 35 G/DL (ref 31–36)
MCV RBC AUTO: 91.9 FL (ref 80–100)
PDW BLD-RTO: 13.6 % (ref 12.4–15.4)
PLATELET # BLD: 142 K/UL (ref 135–450)
PMV BLD AUTO: 9.5 FL (ref 5–10.5)
POTASSIUM SERPL-SCNC: 4.4 MMOL/L (ref 3.5–5.1)
PROTHROMBIN TIME: 13.8 SEC (ref 11.7–14.5)
RBC # BLD: 4.73 M/UL (ref 4.2–5.9)
SODIUM BLD-SCNC: 135 MMOL/L (ref 136–145)
TOTAL PROTEIN: 7.5 G/DL (ref 6.4–8.2)
TRIGL SERPL-MCNC: 93 MG/DL (ref 0–150)
TSH SERPL DL<=0.05 MIU/L-ACNC: 1.72 UIU/ML (ref 0.27–4.2)
VLDLC SERPL CALC-MCNC: 19 MG/DL
WBC # BLD: 6 K/UL (ref 4–11)

## 2023-03-11 LAB
ESTIMATED AVERAGE GLUCOSE: 68.1 MG/DL
HBA1C MFR BLD: 4 %

## 2023-03-13 ENCOUNTER — PATIENT MESSAGE (OUTPATIENT)
Dept: FAMILY MEDICINE CLINIC | Age: 38
End: 2023-03-13

## 2023-03-13 ENCOUNTER — OFFICE VISIT (OUTPATIENT)
Dept: FAMILY MEDICINE CLINIC | Age: 38
End: 2023-03-13
Payer: COMMERCIAL

## 2023-03-13 VITALS
RESPIRATION RATE: 12 BRPM | DIASTOLIC BLOOD PRESSURE: 74 MMHG | SYSTOLIC BLOOD PRESSURE: 120 MMHG | WEIGHT: 210.8 LBS | HEART RATE: 54 BPM | BODY MASS INDEX: 25.66 KG/M2 | OXYGEN SATURATION: 98 % | TEMPERATURE: 97.5 F

## 2023-03-13 DIAGNOSIS — E80.4 GILBERT DISEASE: ICD-10-CM

## 2023-03-13 DIAGNOSIS — D33.3 ACOUSTIC NEUROMA (HCC): ICD-10-CM

## 2023-03-13 DIAGNOSIS — Z01.810 PREOP CARDIOVASCULAR EXAM: Primary | ICD-10-CM

## 2023-03-13 PROCEDURE — 99214 OFFICE O/P EST MOD 30 MIN: CPT | Performed by: FAMILY MEDICINE

## 2023-03-13 RX ORDER — VIT C/B6/B5/MAGNESIUM/HERB 173 50-5-6-5MG
CAPSULE ORAL DAILY
COMMUNITY

## 2023-03-13 SDOH — ECONOMIC STABILITY: FOOD INSECURITY: WITHIN THE PAST 12 MONTHS, YOU WORRIED THAT YOUR FOOD WOULD RUN OUT BEFORE YOU GOT MONEY TO BUY MORE.: NEVER TRUE

## 2023-03-13 SDOH — ECONOMIC STABILITY: FOOD INSECURITY: WITHIN THE PAST 12 MONTHS, THE FOOD YOU BOUGHT JUST DIDN'T LAST AND YOU DIDN'T HAVE MONEY TO GET MORE.: NEVER TRUE

## 2023-03-13 SDOH — ECONOMIC STABILITY: INCOME INSECURITY: HOW HARD IS IT FOR YOU TO PAY FOR THE VERY BASICS LIKE FOOD, HOUSING, MEDICAL CARE, AND HEATING?: NOT HARD AT ALL

## 2023-03-13 SDOH — ECONOMIC STABILITY: HOUSING INSECURITY
IN THE LAST 12 MONTHS, WAS THERE A TIME WHEN YOU DID NOT HAVE A STEADY PLACE TO SLEEP OR SLEPT IN A SHELTER (INCLUDING NOW)?: NO

## 2023-03-13 ASSESSMENT — PATIENT HEALTH QUESTIONNAIRE - PHQ9
SUM OF ALL RESPONSES TO PHQ9 QUESTIONS 1 & 2: 0
SUM OF ALL RESPONSES TO PHQ QUESTIONS 1-9: 0
1. LITTLE INTEREST OR PLEASURE IN DOING THINGS: 0
SUM OF ALL RESPONSES TO PHQ QUESTIONS 1-9: 0
2. FEELING DOWN, DEPRESSED OR HOPELESS: 0

## 2023-03-13 NOTE — PROGRESS NOTES
Subjective:    Chief Complaint:     Cesilia Watson is a 44 y.o. male who presents for a preoperative physical examination. He is scheduled to have removal acoustic neuroma done by Dr. Kali Geiger at [facility] on 4/5/2023. Pt has been working with ENT for concerns of asymmetrical hearing loss. Pt had testing done and noted asymmetrical hearing loss on left. Pt was given steroid pack and had MRI showing left sided acoustic neuroma. Pt was referred to neurosurgery and will be having surgery done as above. Pt is feeling good about getting this done, states he is managing the stressors. Pt will be in the hospital for 2-3d based on surgery and balance/recovery. Pt denies any current issues of balance, vision changes. Pt anticipates will be out of work for 2-3 weeks. Pt will go home and has a great support system. Pt did have bloodwork done and would like to review. History of Present Illness:        Past Medical History:   Diagnosis Date    Acoustic neuroma ([address]     Allergic rhinitis         Review of patient's past surgical history indicates:     Past Surgical History:   Procedure Laterality Date    VARICOCELECTOMY                                                     Current Outpatient Medications   Medication Sig Dispense Refill    finasteride (PROPECIA) 1 MG tablet Take 1 tablet by mouth daily 90 tablet 3    Adapalene-Benzoyl Peroxide (EPIDUO FORTE) 0.3-2.5 % GEL APPLY TO FACE ONCE DAILY FOR ACNE 45 g 0    azelastine (ASTELIN) 0.1 % nasal spray 2 sprays by Nasal route 2 times daily Use in each nostril as directed 1 each 5    fluticasone (FLONASE) 50 MCG/ACT nasal spray 1 spray by Nasal route daily 1 each 5    amphetamine-dextroamphetamine (ADDERALL) 20 MG tablet Take 10 mg by mouth daily.  Patient takes 5-10mg a few times weekly      Multiple Vitamins-Minerals (MULTIVITAMIN ADULT PO) Take 1 tablet by mouth daily      predniSONE (DELTASONE) 10 MG tablet 4 tabs a day for 5 days, 3 tabs a day for 3 days, 2 tabs a day for 3 days,1 tab a day for 3 days (Patient not taking: Reported on 3/13/2023) 38 tablet 0     No current facility-administered medications for this visit. Allergies   Allergen Reactions    Amoxicillin Rash       Social History     Tobacco Use    Smoking status: Former     Types: Cigars    Smokeless tobacco: Never   Substance Use Topics    Alcohol use: Yes     Comment: socially        Family History   Problem Relation Age of Onset    Cancer Paternal Grandfather         melanoma        Review Of Systems    Skin: no abnormal pigmentation, rash, scaling, itching, masses, hair or nail changes  Eyes: negative  Ears/Nose/Throat: + as above   Respiratory: negative  Cardiovascular: negative  Gastrointestinal: negative  Genitourinary: negative  Musculoskeletal: negative  Neurologic: negative  Psychiatric: negative  Hematologic/Lymphatic/Immunologic: negative  Endocrine: negative       Objective:      /74   Pulse 54   Temp 97.5 °F (36.4 °C) (Temporal)   Resp 12   Wt 210 lb 12.8 oz (95.6 kg)   SpO2 98%   BMI 25.66 kg/m²   General appearance - healthy, alert, no distress  Skin - Skin color, texture, turgor normal. No rashes or lesions. Head - Normocephalic. No masses, lesions, tenderness or abnormalities  Eyes - conjunctivae/corneas clear. PERRL, EOM's intact. Ears - External ears normal. Canals clear. TM's normal.  Nose/Sinuses - Nares normal. Septum midline. Mucosa normal. No drainage or sinus tenderness. Oropharynx - Lips, mucosa, and tongue normal. Teeth and gums normal.   Neck - Neck supple. No adenopathy. Thyroid symmetric, normal size,  Back - Back symmetric, no curvature. ROM normal. No CVA tenderness. Lungs - Percussion normal. Good diaphragmatic excursion. Lungs clear  Heart - Regular rate and rhythm, with no rub, murmur or gallop noted. Abdomen - Abdomen soft, non-tender.  BS normal. No masses, organomegaly  Extremities - Extremities normal. No deformities, edema, or skin discoloration  Musculoskeletal - Spine ROM normal. Muscular strength intact. Peripheral pulses - radial=4/4,, femoral=4/4, popliteal=4/4, dorsalis pedis=4/4,  Neuro - Gait normal. Reflexes normal and symmetric. Sensation grossly normal.  No focal weakness          Orders Only on 03/10/2023   Component Date Value Ref Range Status    Cholesterol, Total 03/10/2023 202 (A)  0 - 199 mg/dL Final    Triglycerides 03/10/2023 93  0 - 150 mg/dL Final    HDL 03/10/2023 48  40 - 60 mg/dL Final    Comment: An HDL cholesterol less than 40 mg/dL is low and  constitutes a coronary heart disease risk factor. An HDL cholesterol greater than 60 mg/dL is a  negative risk factor for coronary heart disease. LDL Calculated 03/10/2023 135 (A)  <100 mg/dL Final    VLDL Cholesterol Calculated 03/10/2023 19  Not Established mg/dL Final    Hemoglobin A1C 03/10/2023 4.0  See comment % Final    Comment: Comment:  Diagnosis of Diabetes: > or = 6.5%  Increased risk of diabetes (Prediabetes): 5.7-6.4%  Glycemic Control: Nonpregnant Adults: <7.0%                    Pregnant: <6.0%        eAG 03/10/2023 68.1  mg/dL Final    TSH 03/10/2023 1.72  0.27 - 4.20 uIU/mL Final    Sodium 03/10/2023 135 (A)  136 - 145 mmol/L Final    Potassium 03/10/2023 4.4  3.5 - 5.1 mmol/L Final    Chloride 03/10/2023 96 (A)  99 - 110 mmol/L Final    CO2 03/10/2023 25  21 - 32 mmol/L Final    Anion Gap 03/10/2023 14  3 - 16 Final    Glucose 03/10/2023 91  70 - 99 mg/dL Final    BUN 03/10/2023 13  7 - 20 mg/dL Final    Creatinine 03/10/2023 0.9  0.9 - 1.3 mg/dL Final    Est, Glom Filt Rate 03/10/2023 >60  >60 Final    Comment: Pediatric calculator link  Roderick.[email or web address] org/professionals/kdoqi/gfr_calculatorped  Effective Oct 3, 2022  These results are not intended for use in patients  <25years of age. eGFR results are calculated without  a race factor using the 2021 CKD-EPI equation.   Careful  clinical correlation is recommended, particularly when  comparing to results calculated using previous equations. The CKD-EPI equation is less accurate in patients with  extremes of muscle mass, extra-renal metabolism of  creatinine, excessive creatinine ingestion, or following  therapy that affects renal tubular secretion. Calcium 03/10/2023 9.9  8.3 - 10.6 mg/dL Final    Total Protein 03/10/2023 7.5  6.4 - 8.2 g/dL Final    Albumin 03/10/2023 5.0  3.4 - 5.0 g/dL Final    Albumin/Globulin Ratio 03/10/2023 2.0  1.1 - 2.2 Final    Total Bilirubin 03/10/2023 1.9 (A)  0.0 - 1.0 mg/dL Final    Alkaline Phosphatase 03/10/2023 44  40 - 129 U/L Final    ALT 03/10/2023 12  10 - 40 U/L Final    AST 03/10/2023 24  15 - 37 U/L Final    WBC 03/10/2023 6.0  4.0 - 11.0 K/uL Final    RBC 03/10/2023 4.73  4.20 - 5.90 M/uL Final    Hemoglobin 03/10/2023 15.2  13.5 - 17.5 g/dL Final    Hematocrit 03/10/2023 43.5  40.5 - 52.5 % Final    MCV 03/10/2023 91.9  80.0 - 100.0 fL Final    MCH 03/10/2023 32.1  26.0 - 34.0 pg Final    MCHC 03/10/2023 35.0  31.0 - 36.0 g/dL Final    RDW 03/10/2023 13.6  12.4 - 15.4 % Final    Platelets 96/34/6015 142  135 - 450 K/uL Final    MPV 03/10/2023 9.5  5.0 - 10.5 fL Final    aPTT 03/10/2023 31.0  23.0 - 34.3 sec Final    Comment: Therapeutic range: 73.0 - 102.0 sec    Effective 5-25-22 9:00am EST  Please note reference ranges have  changed for PTT Testing. Protime 03/10/2023 13.8  11.7 - 14.5 sec Final    Comment: Effective 5-25-22 09:00am EST  Please note reference ranges have  changed for PT and INR Testing. INR 03/10/2023 1.07  0.87 - 1.14 Final    Comment: Effective 5/25/22 at 09:00am EST    Normal: 0.87 - 1.14  Therapeutic: 2.0 - 3.0  Pros. Valve: 2.5 - 3.5  AMI: 2.0 - 3.0            ASSESSMENT / PLAN:    1. Preop cardiovascular exam  No focal abnormalities on exam  Medically cleared for surgery. 2. Acoustic neuroma ([facility] [address]  Reviewed eval, imaging  Set for surgery  Medically cleared for surgery.       3. Terryl Bourdon disease  Reviewed bloodwork  The patient is reassured that these symptoms do not appear to represent a serious or threatening condition. Follow-up appointment:   After surgery    Discussed use, benefit, and side effects of all prescribed medications. Barriers to medication compliance addressed. All patient questions answered. Pt voiced understanding. When applicable, patient's outside records were reviewed through [facility]. The patient has signed appropriate paperworks/consents. Per encounter diagnoses   He is medically cleared for surgery and anesthesia. Avoid Aspirin, non steroidal anti inflammatory medications, including Motrin, Aleve, Ibuprofen, Advil; multi vitamins, Vitamin E, omega 3 fish oil, and glucosamine chondroitin for the 7 days prior to surgery.

## 2023-03-13 NOTE — TELEPHONE ENCOUNTER
From: Latisha Bynum  To: Dr. Brijesh Webber  Sent: 3/13/2023 12:22 PM EDT  Subject: Following Up    Thanks for today's visit. A few items I forgot to cover. .. Questions:  - Should I discontinue Adderall, Finasteride, and/or nasal sprays ahead of surgery? I don't see any of these specifically listed in my pre-op booklet. If so, by when? - Am I due for a Hepatitis C Screening (overdue per MyChart)? If so, by when? Medical History:  I'm not seeing some things recorded in [address] so can you please add or ensure whatever is appropriate for us to have on file ahead of my craniotomy:  - Flu Shot (last 12/1/22)  - Skin Cancer (basal cell carcinoma removed November 2018 from right calf)  - FUE Hair Restoration Procedure (2014)  - Deviated Septum (right side; ~2007)  - Raynaud's (lifelong)    I also had spine/back imaging done years ago in [place] and vaguely remember flagging a vertebrae issue but can't seem to find this info. Is there a database or certain path for me to explore retrieving? I've been dealing with lower back pressure for as long as I can remember. Thanks!

## 2023-04-04 ENCOUNTER — ANESTHESIA EVENT (OUTPATIENT)
Dept: OPERATING ROOM | Age: 38
End: 2023-04-04
Payer: COMMERCIAL

## 2023-04-05 ENCOUNTER — ANESTHESIA (OUTPATIENT)
Dept: OPERATING ROOM | Age: 38
End: 2023-04-05
Payer: COMMERCIAL

## 2023-04-05 ENCOUNTER — HOSPITAL ENCOUNTER (INPATIENT)
Age: 38
LOS: 2 days | Discharge: HOME | DRG: 027 | End: 2023-04-07
Attending: NEUROLOGICAL SURGERY | Admitting: NEUROLOGICAL SURGERY
Payer: COMMERCIAL

## 2023-04-05 ENCOUNTER — APPOINTMENT (OUTPATIENT)
Dept: CT IMAGING | Age: 38
DRG: 027 | End: 2023-04-05
Attending: NEUROLOGICAL SURGERY
Payer: COMMERCIAL

## 2023-04-05 DIAGNOSIS — Z98.890 S/P CRANIOTOMY: Primary | ICD-10-CM

## 2023-04-05 DIAGNOSIS — D33.3 ACOUSTIC NEUROMA (HCC): ICD-10-CM

## 2023-04-05 LAB
ABO + RH BLD: NORMAL
BLD GP AB SCN SERPL QL: NORMAL

## 2023-04-05 PROCEDURE — 2500000003 HC RX 250 WO HCPCS: Performed by: NEUROLOGICAL SURGERY

## 2023-04-05 PROCEDURE — 2580000003 HC RX 258: Performed by: NURSE PRACTITIONER

## 2023-04-05 PROCEDURE — 2580000003 HC RX 258: Performed by: ANESTHESIOLOGY

## 2023-04-05 PROCEDURE — 3600000014 HC SURGERY LEVEL 4 ADDTL 15MIN: Performed by: NEUROLOGICAL SURGERY

## 2023-04-05 PROCEDURE — 7100000001 HC PACU RECOVERY - ADDTL 15 MIN: Performed by: NEUROLOGICAL SURGERY

## 2023-04-05 PROCEDURE — 86900 BLOOD TYPING SEROLOGIC ABO: CPT

## 2023-04-05 PROCEDURE — 2580000003 HC RX 258

## 2023-04-05 PROCEDURE — 6360000002 HC RX W HCPCS

## 2023-04-05 PROCEDURE — 2720000010 HC SURG SUPPLY STERILE: Performed by: NEUROLOGICAL SURGERY

## 2023-04-05 PROCEDURE — 2000000000 HC ICU R&B

## 2023-04-05 PROCEDURE — 3700000001 HC ADD 15 MINUTES (ANESTHESIA): Performed by: NEUROLOGICAL SURGERY

## 2023-04-05 PROCEDURE — 86901 BLOOD TYPING SEROLOGIC RH(D): CPT

## 2023-04-05 PROCEDURE — 99291 CRITICAL CARE FIRST HOUR: CPT

## 2023-04-05 PROCEDURE — 2709999900 HC NON-CHARGEABLE SUPPLY: Performed by: NEUROLOGICAL SURGERY

## 2023-04-05 PROCEDURE — 70450 CT HEAD/BRAIN W/O DYE: CPT

## 2023-04-05 PROCEDURE — 6360000002 HC RX W HCPCS: Performed by: NURSE ANESTHETIST, CERTIFIED REGISTERED

## 2023-04-05 PROCEDURE — 7100000000 HC PACU RECOVERY - FIRST 15 MIN: Performed by: NEUROLOGICAL SURGERY

## 2023-04-05 PROCEDURE — 6360000002 HC RX W HCPCS: Performed by: NURSE PRACTITIONER

## 2023-04-05 PROCEDURE — 2500000003 HC RX 250 WO HCPCS: Performed by: NURSE ANESTHETIST, CERTIFIED REGISTERED

## 2023-04-05 PROCEDURE — 2780000010 HC IMPLANT OTHER: Performed by: NEUROLOGICAL SURGERY

## 2023-04-05 PROCEDURE — 3700000000 HC ANESTHESIA ATTENDED CARE: Performed by: NEUROLOGICAL SURGERY

## 2023-04-05 PROCEDURE — 6360000002 HC RX W HCPCS: Performed by: NEUROLOGICAL SURGERY

## 2023-04-05 PROCEDURE — 6360000002 HC RX W HCPCS: Performed by: ANESTHESIOLOGY

## 2023-04-05 PROCEDURE — 6370000000 HC RX 637 (ALT 250 FOR IP): Performed by: NURSE PRACTITIONER

## 2023-04-05 PROCEDURE — 86850 RBC ANTIBODY SCREEN: CPT

## 2023-04-05 PROCEDURE — 00BC0ZZ EXCISION OF CEREBELLUM, OPEN APPROACH: ICD-10-PCS | Performed by: NEUROLOGICAL SURGERY

## 2023-04-05 PROCEDURE — 6370000000 HC RX 637 (ALT 250 FOR IP): Performed by: NEUROLOGICAL SURGERY

## 2023-04-05 PROCEDURE — 2580000003 HC RX 258: Performed by: NURSE ANESTHETIST, CERTIFIED REGISTERED

## 2023-04-05 PROCEDURE — 3600000004 HC SURGERY LEVEL 4 BASE: Performed by: NEUROLOGICAL SURGERY

## 2023-04-05 DEVICE — ULTRA THIN SHEET
Type: IMPLANTABLE DEVICE | Status: FUNCTIONAL
Brand: MEDPOR

## 2023-04-05 DEVICE — DURA 62105 SUBSTITUTE DUREPAIR 3X3IN NCE
Type: IMPLANTABLE DEVICE | Site: SCALP | Status: FUNCTIONAL
Brand: DUREPAIR®

## 2023-04-05 RX ORDER — SODIUM CHLORIDE 0.9 % (FLUSH) 0.9 %
5-40 SYRINGE (ML) INJECTION PRN
Status: DISCONTINUED | OUTPATIENT
Start: 2023-04-05 | End: 2023-04-05 | Stop reason: HOSPADM

## 2023-04-05 RX ORDER — FLUTICASONE PROPIONATE 50 MCG
1 SPRAY, SUSPENSION (ML) NASAL DAILY
Status: DISCONTINUED | OUTPATIENT
Start: 2023-04-06 | End: 2023-04-07 | Stop reason: HOSPADM

## 2023-04-05 RX ORDER — SENNA AND DOCUSATE SODIUM 50; 8.6 MG/1; MG/1
1 TABLET, FILM COATED ORAL 2 TIMES DAILY
Status: DISCONTINUED | OUTPATIENT
Start: 2023-04-05 | End: 2023-04-07 | Stop reason: HOSPADM

## 2023-04-05 RX ORDER — PROCHLORPERAZINE EDISYLATE 5 MG/ML
10 INJECTION INTRAMUSCULAR; INTRAVENOUS EVERY 6 HOURS PRN
Status: DISCONTINUED | OUTPATIENT
Start: 2023-04-05 | End: 2023-04-07 | Stop reason: HOSPADM

## 2023-04-05 RX ORDER — DEXAMETHASONE SODIUM PHOSPHATE 4 MG/ML
INJECTION, SOLUTION INTRA-ARTICULAR; INTRALESIONAL; INTRAMUSCULAR; INTRAVENOUS; SOFT TISSUE PRN
Status: DISCONTINUED | OUTPATIENT
Start: 2023-04-05 | End: 2023-04-05 | Stop reason: SDUPTHER

## 2023-04-05 RX ORDER — MEPERIDINE HYDROCHLORIDE 25 MG/ML
12.5 INJECTION INTRAMUSCULAR; INTRAVENOUS; SUBCUTANEOUS EVERY 5 MIN PRN
Status: DISCONTINUED | OUTPATIENT
Start: 2023-04-05 | End: 2023-04-05 | Stop reason: HOSPADM

## 2023-04-05 RX ORDER — CLINDAMYCIN PHOSPHATE 900 MG/50ML
900 INJECTION INTRAVENOUS ONCE
Status: COMPLETED | OUTPATIENT
Start: 2023-04-05 | End: 2023-04-05

## 2023-04-05 RX ORDER — DEXAMETHASONE 4 MG/1
4 TABLET ORAL EVERY 6 HOURS SCHEDULED
Status: COMPLETED | OUTPATIENT
Start: 2023-04-05 | End: 2023-04-06

## 2023-04-05 RX ORDER — LIDOCAINE HYDROCHLORIDE 20 MG/ML
INJECTION, SOLUTION INTRAVENOUS PRN
Status: DISCONTINUED | OUTPATIENT
Start: 2023-04-05 | End: 2023-04-05 | Stop reason: SDUPTHER

## 2023-04-05 RX ORDER — FENTANYL CITRATE 50 UG/ML
INJECTION, SOLUTION INTRAMUSCULAR; INTRAVENOUS PRN
Status: DISCONTINUED | OUTPATIENT
Start: 2023-04-05 | End: 2023-04-05 | Stop reason: SDUPTHER

## 2023-04-05 RX ORDER — DEXAMETHASONE 4 MG/1
4 TABLET ORAL EVERY 8 HOURS SCHEDULED
Status: COMPLETED | OUTPATIENT
Start: 2023-04-06 | End: 2023-04-07

## 2023-04-05 RX ORDER — NALOXONE HYDROCHLORIDE 0.4 MG/ML
0.2 INJECTION, SOLUTION INTRAMUSCULAR; INTRAVENOUS; SUBCUTANEOUS PRN
Status: DISCONTINUED | OUTPATIENT
Start: 2023-04-05 | End: 2023-04-07 | Stop reason: HOSPADM

## 2023-04-05 RX ORDER — GLYCOPYRROLATE 0.2 MG/ML
INJECTION INTRAMUSCULAR; INTRAVENOUS PRN
Status: DISCONTINUED | OUTPATIENT
Start: 2023-04-05 | End: 2023-04-05 | Stop reason: SDUPTHER

## 2023-04-05 RX ORDER — MANNITOL 20 G/100ML
INJECTION, SOLUTION INTRAVENOUS PRN
Status: DISCONTINUED | OUTPATIENT
Start: 2023-04-05 | End: 2023-04-05 | Stop reason: SDUPTHER

## 2023-04-05 RX ORDER — METOCLOPRAMIDE HYDROCHLORIDE 5 MG/ML
10 INJECTION INTRAMUSCULAR; INTRAVENOUS
Status: DISCONTINUED | OUTPATIENT
Start: 2023-04-05 | End: 2023-04-05 | Stop reason: HOSPADM

## 2023-04-05 RX ORDER — AZELASTINE 1 MG/ML
2 SPRAY, METERED NASAL 2 TIMES DAILY
Status: DISCONTINUED | OUTPATIENT
Start: 2023-04-05 | End: 2023-04-07 | Stop reason: HOSPADM

## 2023-04-05 RX ORDER — ACETAMINOPHEN 500 MG
1000 TABLET ORAL EVERY 6 HOURS SCHEDULED
Status: DISCONTINUED | OUTPATIENT
Start: 2023-04-05 | End: 2023-04-07 | Stop reason: HOSPADM

## 2023-04-05 RX ORDER — BISACODYL 10 MG
10 SUPPOSITORY, RECTAL RECTAL DAILY PRN
Status: DISCONTINUED | OUTPATIENT
Start: 2023-04-05 | End: 2023-04-07 | Stop reason: HOSPADM

## 2023-04-05 RX ORDER — SODIUM CHLORIDE 0.9 % (FLUSH) 0.9 %
5-40 SYRINGE (ML) INJECTION EVERY 12 HOURS SCHEDULED
Status: DISCONTINUED | OUTPATIENT
Start: 2023-04-05 | End: 2023-04-05 | Stop reason: HOSPADM

## 2023-04-05 RX ORDER — REMIFENTANIL HYDROCHLORIDE 1 MG/ML
INJECTION, POWDER, LYOPHILIZED, FOR SOLUTION INTRAVENOUS CONTINUOUS PRN
Status: DISCONTINUED | OUTPATIENT
Start: 2023-04-05 | End: 2023-04-05 | Stop reason: SDUPTHER

## 2023-04-05 RX ORDER — SODIUM CHLORIDE, SODIUM LACTATE, POTASSIUM CHLORIDE, CALCIUM CHLORIDE 600; 310; 30; 20 MG/100ML; MG/100ML; MG/100ML; MG/100ML
INJECTION, SOLUTION INTRAVENOUS CONTINUOUS
Status: DISCONTINUED | OUTPATIENT
Start: 2023-04-05 | End: 2023-04-05 | Stop reason: HOSPADM

## 2023-04-05 RX ORDER — SODIUM CHLORIDE 9 MG/ML
INJECTION, SOLUTION INTRAVENOUS PRN
Status: DISCONTINUED | OUTPATIENT
Start: 2023-04-05 | End: 2023-04-07 | Stop reason: HOSPADM

## 2023-04-05 RX ORDER — BUPIVACAINE HYDROCHLORIDE AND EPINEPHRINE 5; 5 MG/ML; UG/ML
INJECTION, SOLUTION EPIDURAL; INTRACAUDAL; PERINEURAL PRN
Status: DISCONTINUED | OUTPATIENT
Start: 2023-04-05 | End: 2023-04-05 | Stop reason: HOSPADM

## 2023-04-05 RX ORDER — SUCCINYLCHOLINE/SOD CL,ISO/PF 200MG/10ML
SYRINGE (ML) INTRAVENOUS PRN
Status: DISCONTINUED | OUTPATIENT
Start: 2023-04-05 | End: 2023-04-05 | Stop reason: SDUPTHER

## 2023-04-05 RX ORDER — SODIUM CHLORIDE, SODIUM LACTATE, POTASSIUM CHLORIDE, AND CALCIUM CHLORIDE .6; .31; .03; .02 G/100ML; G/100ML; G/100ML; G/100ML
IRRIGANT IRRIGATION PRN
Status: DISCONTINUED | OUTPATIENT
Start: 2023-04-05 | End: 2023-04-05 | Stop reason: HOSPADM

## 2023-04-05 RX ORDER — LIDOCAINE HYDROCHLORIDE 10 MG/ML
1 INJECTION, SOLUTION EPIDURAL; INFILTRATION; INTRACAUDAL; PERINEURAL
Status: DISCONTINUED | OUTPATIENT
Start: 2023-04-05 | End: 2023-04-05 | Stop reason: HOSPADM

## 2023-04-05 RX ORDER — OXYCODONE HYDROCHLORIDE 5 MG/1
10 TABLET ORAL EVERY 4 HOURS PRN
Status: DISCONTINUED | OUTPATIENT
Start: 2023-04-05 | End: 2023-04-07 | Stop reason: HOSPADM

## 2023-04-05 RX ORDER — DIAZEPAM 5 MG/1
5 TABLET ORAL EVERY 6 HOURS PRN
Status: DISCONTINUED | OUTPATIENT
Start: 2023-04-05 | End: 2023-04-07 | Stop reason: HOSPADM

## 2023-04-05 RX ORDER — FIBRINOGEN HUMAN AND THROMBIN HUMAN 90; 500 [IU]/ML; [IU]/ML
SOLUTION TOPICAL PRN
Status: DISCONTINUED | OUTPATIENT
Start: 2023-04-05 | End: 2023-04-05 | Stop reason: ALTCHOICE

## 2023-04-05 RX ORDER — SCOLOPAMINE TRANSDERMAL SYSTEM 1 MG/1
1 PATCH, EXTENDED RELEASE TRANSDERMAL
Status: DISCONTINUED | OUTPATIENT
Start: 2023-04-05 | End: 2023-04-07 | Stop reason: HOSPADM

## 2023-04-05 RX ORDER — ROCURONIUM BROMIDE 10 MG/ML
INJECTION, SOLUTION INTRAVENOUS PRN
Status: DISCONTINUED | OUTPATIENT
Start: 2023-04-05 | End: 2023-04-05 | Stop reason: SDUPTHER

## 2023-04-05 RX ORDER — DIPHENHYDRAMINE HYDROCHLORIDE 50 MG/ML
12.5 INJECTION INTRAMUSCULAR; INTRAVENOUS
Status: DISCONTINUED | OUTPATIENT
Start: 2023-04-05 | End: 2023-04-05 | Stop reason: HOSPADM

## 2023-04-05 RX ORDER — METHOCARBAMOL 750 MG/1
750 TABLET, FILM COATED ORAL 4 TIMES DAILY
Status: DISCONTINUED | OUTPATIENT
Start: 2023-04-06 | End: 2023-04-07 | Stop reason: HOSPADM

## 2023-04-05 RX ORDER — HEPARIN SODIUM 5000 [USP'U]/ML
5000 INJECTION, SOLUTION INTRAVENOUS; SUBCUTANEOUS EVERY 8 HOURS SCHEDULED
Status: DISCONTINUED | OUTPATIENT
Start: 2023-04-06 | End: 2023-04-07 | Stop reason: HOSPADM

## 2023-04-05 RX ORDER — SODIUM CHLORIDE, SODIUM LACTATE, POTASSIUM CHLORIDE, CALCIUM CHLORIDE 600; 310; 30; 20 MG/100ML; MG/100ML; MG/100ML; MG/100ML
INJECTION, SOLUTION INTRAVENOUS CONTINUOUS
Status: DISCONTINUED | OUTPATIENT
Start: 2023-04-05 | End: 2023-04-06

## 2023-04-05 RX ORDER — OXYCODONE HYDROCHLORIDE 5 MG/1
5 TABLET ORAL EVERY 4 HOURS PRN
Status: DISCONTINUED | OUTPATIENT
Start: 2023-04-05 | End: 2023-04-07 | Stop reason: HOSPADM

## 2023-04-05 RX ORDER — ONDANSETRON 2 MG/ML
INJECTION INTRAMUSCULAR; INTRAVENOUS PRN
Status: DISCONTINUED | OUTPATIENT
Start: 2023-04-05 | End: 2023-04-05 | Stop reason: SDUPTHER

## 2023-04-05 RX ORDER — SODIUM CHLORIDE 0.9 % (FLUSH) 0.9 %
5-40 SYRINGE (ML) INJECTION EVERY 12 HOURS SCHEDULED
Status: DISCONTINUED | OUTPATIENT
Start: 2023-04-05 | End: 2023-04-07 | Stop reason: HOSPADM

## 2023-04-05 RX ORDER — ONDANSETRON 2 MG/ML
4 INJECTION INTRAMUSCULAR; INTRAVENOUS EVERY 6 HOURS PRN
Status: DISCONTINUED | OUTPATIENT
Start: 2023-04-05 | End: 2023-04-07 | Stop reason: HOSPADM

## 2023-04-05 RX ORDER — LABETALOL HYDROCHLORIDE 5 MG/ML
20 INJECTION, SOLUTION INTRAVENOUS EVERY 4 HOURS PRN
Status: DISCONTINUED | OUTPATIENT
Start: 2023-04-05 | End: 2023-04-07 | Stop reason: HOSPADM

## 2023-04-05 RX ORDER — ONDANSETRON 4 MG/1
4 TABLET, ORALLY DISINTEGRATING ORAL EVERY 8 HOURS PRN
Status: DISCONTINUED | OUTPATIENT
Start: 2023-04-05 | End: 2023-04-07 | Stop reason: HOSPADM

## 2023-04-05 RX ORDER — LABETALOL HYDROCHLORIDE 5 MG/ML
10 INJECTION, SOLUTION INTRAVENOUS
Status: DISCONTINUED | OUTPATIENT
Start: 2023-04-05 | End: 2023-04-05 | Stop reason: HOSPADM

## 2023-04-05 RX ORDER — FINASTERIDE 1 MG/1
1 TABLET, FILM COATED ORAL DAILY
Status: DISCONTINUED | OUTPATIENT
Start: 2023-04-06 | End: 2023-04-07 | Stop reason: HOSPADM

## 2023-04-05 RX ORDER — PROPOFOL 10 MG/ML
INJECTION, EMULSION INTRAVENOUS PRN
Status: DISCONTINUED | OUTPATIENT
Start: 2023-04-05 | End: 2023-04-05 | Stop reason: SDUPTHER

## 2023-04-05 RX ORDER — HYDRALAZINE HYDROCHLORIDE 20 MG/ML
10 INJECTION INTRAMUSCULAR; INTRAVENOUS
Status: DISCONTINUED | OUTPATIENT
Start: 2023-04-05 | End: 2023-04-05 | Stop reason: HOSPADM

## 2023-04-05 RX ORDER — DEXAMETHASONE 4 MG/1
4 TABLET ORAL EVERY 12 HOURS SCHEDULED
Status: DISCONTINUED | OUTPATIENT
Start: 2023-04-07 | End: 2023-04-07 | Stop reason: HOSPADM

## 2023-04-05 RX ORDER — MIDAZOLAM HYDROCHLORIDE 1 MG/ML
INJECTION INTRAMUSCULAR; INTRAVENOUS PRN
Status: DISCONTINUED | OUTPATIENT
Start: 2023-04-05 | End: 2023-04-05 | Stop reason: SDUPTHER

## 2023-04-05 RX ORDER — SODIUM CHLORIDE 0.9 % (FLUSH) 0.9 %
5-40 SYRINGE (ML) INJECTION PRN
Status: DISCONTINUED | OUTPATIENT
Start: 2023-04-05 | End: 2023-04-07 | Stop reason: HOSPADM

## 2023-04-05 RX ORDER — PROPOFOL 10 MG/ML
INJECTION, EMULSION INTRAVENOUS CONTINUOUS PRN
Status: DISCONTINUED | OUTPATIENT
Start: 2023-04-05 | End: 2023-04-05 | Stop reason: SDUPTHER

## 2023-04-05 RX ORDER — POLYETHYLENE GLYCOL 3350 17 G/17G
17 POWDER, FOR SOLUTION ORAL DAILY
Status: DISCONTINUED | OUTPATIENT
Start: 2023-04-05 | End: 2023-04-07 | Stop reason: HOSPADM

## 2023-04-05 RX ORDER — DEXAMETHASONE 4 MG/1
4 TABLET ORAL DAILY
Status: DISCONTINUED | OUTPATIENT
Start: 2023-04-09 | End: 2023-04-07 | Stop reason: HOSPADM

## 2023-04-05 RX ORDER — SODIUM CHLORIDE 9 MG/ML
25 INJECTION, SOLUTION INTRAVENOUS PRN
Status: DISCONTINUED | OUTPATIENT
Start: 2023-04-05 | End: 2023-04-05 | Stop reason: HOSPADM

## 2023-04-05 RX ORDER — SODIUM CHLORIDE 9 MG/ML
INJECTION, SOLUTION INTRAVENOUS CONTINUOUS PRN
Status: DISCONTINUED | OUTPATIENT
Start: 2023-04-05 | End: 2023-04-05 | Stop reason: SDUPTHER

## 2023-04-05 RX ADMIN — CLINDAMYCIN PHOSPHATE 900 MG: 900 INJECTION, SOLUTION INTRAVENOUS at 07:30

## 2023-04-05 RX ADMIN — MIDAZOLAM HYDROCHLORIDE 2 MG: 2 INJECTION, SOLUTION INTRAMUSCULAR; INTRAVENOUS at 07:30

## 2023-04-05 RX ADMIN — PROPOFOL 120 MCG/KG/MIN: 10 INJECTION, EMULSION INTRAVENOUS at 07:39

## 2023-04-05 RX ADMIN — CEFAZOLIN 2000 MG: 2 INJECTION, POWDER, FOR SOLUTION INTRAMUSCULAR; INTRAVENOUS at 17:36

## 2023-04-05 RX ADMIN — REMIFENTANIL HYDROCHLORIDE 0.12 MCG/KG/MIN: 1 INJECTION, POWDER, LYOPHILIZED, FOR SOLUTION INTRAVENOUS at 07:39

## 2023-04-05 RX ADMIN — METHOCARBAMOL 1000 MG: 100 INJECTION INTRAMUSCULAR; INTRAVENOUS at 15:48

## 2023-04-05 RX ADMIN — SODIUM CHLORIDE, PRESERVATIVE FREE 10 ML: 5 INJECTION INTRAVENOUS at 22:30

## 2023-04-05 RX ADMIN — ROCURONIUM BROMIDE 30 MG: 10 INJECTION INTRAVENOUS at 13:28

## 2023-04-05 RX ADMIN — SODIUM CHLORIDE, POTASSIUM CHLORIDE, SODIUM LACTATE AND CALCIUM CHLORIDE: 600; 310; 30; 20 INJECTION, SOLUTION INTRAVENOUS at 16:53

## 2023-04-05 RX ADMIN — HYDROMORPHONE HYDROCHLORIDE 0.5 MG: 1 INJECTION, SOLUTION INTRAMUSCULAR; INTRAVENOUS; SUBCUTANEOUS at 14:46

## 2023-04-05 RX ADMIN — SODIUM CHLORIDE: 9 INJECTION, SOLUTION INTRAVENOUS at 07:32

## 2023-04-05 RX ADMIN — GLYCOPYRROLATE 0.4 MG: 0.2 INJECTION INTRAMUSCULAR; INTRAVENOUS at 08:02

## 2023-04-05 RX ADMIN — ONDANSETRON 4 MG: 2 INJECTION INTRAMUSCULAR; INTRAVENOUS at 17:40

## 2023-04-05 RX ADMIN — GLYCOPYRROLATE 0.2 MG: 0.2 INJECTION INTRAMUSCULAR; INTRAVENOUS at 07:58

## 2023-04-05 RX ADMIN — DEXAMETHASONE SODIUM PHOSPHATE 12 MG: 4 INJECTION, SOLUTION INTRAMUSCULAR; INTRAVENOUS at 07:53

## 2023-04-05 RX ADMIN — HYDROMORPHONE HYDROCHLORIDE 0.5 MG: 1 INJECTION, SOLUTION INTRAMUSCULAR; INTRAVENOUS; SUBCUTANEOUS at 22:21

## 2023-04-05 RX ADMIN — SODIUM CHLORIDE, SODIUM LACTATE, POTASSIUM CHLORIDE, AND CALCIUM CHLORIDE: .6; .31; .03; .02 INJECTION, SOLUTION INTRAVENOUS at 07:30

## 2023-04-05 RX ADMIN — FENTANYL CITRATE 100 MCG: 50 INJECTION, SOLUTION INTRAMUSCULAR; INTRAVENOUS at 07:39

## 2023-04-05 RX ADMIN — PHENYLEPHRINE HYDROCHLORIDE 20 MCG/MIN: 10 INJECTION INTRAVENOUS at 07:39

## 2023-04-05 RX ADMIN — PROPOFOL 200 MG: 10 INJECTION, EMULSION INTRAVENOUS at 07:39

## 2023-04-05 RX ADMIN — SODIUM CHLORIDE, SODIUM LACTATE, POTASSIUM CHLORIDE, AND CALCIUM CHLORIDE: .6; .31; .03; .02 INJECTION, SOLUTION INTRAVENOUS at 11:55

## 2023-04-05 RX ADMIN — SODIUM CHLORIDE, SODIUM LACTATE, POTASSIUM CHLORIDE, AND CALCIUM CHLORIDE: .6; .31; .03; .02 INJECTION, SOLUTION INTRAVENOUS at 09:25

## 2023-04-05 RX ADMIN — Medication 160 MG: at 07:39

## 2023-04-05 RX ADMIN — SUGAMMADEX 200 MG: 100 INJECTION, SOLUTION INTRAVENOUS at 14:00

## 2023-04-05 RX ADMIN — SENNOSIDES AND DOCUSATE SODIUM 1 TABLET: 50; 8.6 TABLET ORAL at 22:30

## 2023-04-05 RX ADMIN — MANNITOL 40 G: 20 INJECTION, SOLUTION INTRAVENOUS at 08:27

## 2023-04-05 RX ADMIN — ACETAMINOPHEN 1000 MG: 500 TABLET ORAL at 17:00

## 2023-04-05 RX ADMIN — ONDANSETRON 4 MG: 2 INJECTION INTRAMUSCULAR; INTRAVENOUS at 07:53

## 2023-04-05 RX ADMIN — POLYETHYLENE GLYCOL (3350) 17 G: 17 POWDER, FOR SOLUTION ORAL at 17:06

## 2023-04-05 RX ADMIN — DEXAMETHASONE 4 MG: 4 TABLET ORAL at 17:00

## 2023-04-05 RX ADMIN — HYDROMORPHONE HYDROCHLORIDE 0.5 MG: 1 INJECTION, SOLUTION INTRAMUSCULAR; INTRAVENOUS; SUBCUTANEOUS at 14:55

## 2023-04-05 RX ADMIN — LIDOCAINE HYDROCHLORIDE 100 MG: 20 INJECTION, SOLUTION INTRAVENOUS at 07:39

## 2023-04-05 RX ADMIN — SODIUM CHLORIDE, SODIUM LACTATE, POTASSIUM CHLORIDE, AND CALCIUM CHLORIDE: .6; .31; .03; .02 INJECTION, SOLUTION INTRAVENOUS at 13:37

## 2023-04-05 ASSESSMENT — PAIN SCALES - GENERAL
PAINLEVEL_OUTOF10: 3
PAINLEVEL_OUTOF10: 0
PAINLEVEL_OUTOF10: 4
PAINLEVEL_OUTOF10: 0
PAINLEVEL_OUTOF10: 7
PAINLEVEL_OUTOF10: 4

## 2023-04-05 ASSESSMENT — PAIN DESCRIPTION - DESCRIPTORS: DESCRIPTORS: ACHING

## 2023-04-05 ASSESSMENT — PAIN DESCRIPTION - LOCATION: LOCATION: HEAD

## 2023-04-05 NOTE — CONSULTS
CONTINUE these medications which have NOT CHANGED    Details   turmeric 500 MG CAPS Take by mouth daily      finasteride (PROPECIA) 1 MG tablet Take 1 tablet by mouth daily  Qty: 90 tablet, Refills: 3      Adapalene-Benzoyl Peroxide (EPIDUO FORTE) 0.3-2.5 % GEL APPLY TO FACE ONCE DAILY FOR ACNE  Qty: 45 g, Refills: 0      azelastine (ASTELIN) 0.1 % nasal spray 2 sprays by Nasal route 2 times daily Use in each nostril as directed  Qty: 1 each, Refills: 5    Associated Diagnoses: Seasonal allergies      fluticasone (FLONASE) 50 MCG/ACT nasal spray 1 spray by Nasal route daily  Qty: 1 each, Refills: 5    Associated Diagnoses: Seasonal allergies      amphetamine-dextroamphetamine (ADDERALL) 20 MG tablet Take 0.5 tablets by mouth daily. Patient takes 5-10mg a few times weekly      Multiple Vitamins-Minerals (MULTIVITAMIN ADULT PO) Take 1 tablet by mouth daily               Physical Exam   PHYSICAL EXAM:  /76   Pulse 72   Temp 98.4 °F (36.9 °C) (Temporal)   Resp 16   Ht 6' 4\" (1.93 m)   Wt 208 lb (94.3 kg)   SpO2 100%   BMI 25.32 kg/m²     General Alert, no distress, well-nourished    Neurologic  Mental status:   Orientation to person, place, time, situation  Attention intact as able to attend well to the exam    Language fluent in conversation  Comprehension intact; follows simple commands    Cranial nerves:   CN2: Visual fields full w/o extinction on confrontational testing, reports vision is a little blurry but he states it is at his baseline for when his contacts are not in  CN 3,4,6: Pupils equal and reactive to light, extraocular muscles intact  CN5: Facial sensation symmetric   CN7: Face symmetric  CN8: Hearing symmetric to spoken voice  CN9: Palate elevated symmetrically  CN11: Traps full strength on shoulder shrug  CN12: Tongue midline with protrusion    Motor Exam:  5/5 strength throughout    Sensory: light touch intact and symmetric in all 4 extremities.    Cerebellar/coordination: finger nose

## 2023-04-05 NOTE — ANESTHESIA POSTPROCEDURE EVALUATION
Department of Anesthesiology  Postprocedure Note    Patient: Alda Lobato  MRN: [id number]  YOB: 1985  Date of evaluation: 4/5/2023      Procedure Summary     Date: 04/05/23 Room / Location: [address] [place] [address] Route [facility] 01 / [facility]    Anesthesia Start: 0730 Anesthesia Stop: 9722    Procedure: LEFT RESTROSIGMOID CRANIOTOMY FOR RESECTION OF VESTIBULAR SCHWANOMMA (Left) Diagnosis:       Acoustic neuroma ([address]      (Acoustic neuroma ([address] [D33.3])    Surgeons: Doris Daugherty MD Responsible Provider: Pj Pereyra MD    Anesthesia Type: general, TIVA ASA Status: 3          Anesthesia Type: No value filed.     Johnny Phase I: Johnny Score: 10    Johnny Phase II:        Anesthesia Post Evaluation    Patient location during evaluation: PACU  Patient participation: complete - patient participated  Level of consciousness: awake and alert  Pain score: 0  Airway patency: patent  Nausea & Vomiting: no nausea and no vomiting  Complications: no  Cardiovascular status: hemodynamically stable  Respiratory status: acceptable  Hydration status: euvolemic

## 2023-04-05 NOTE — ANESTHESIA PRE PROCEDURE
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       Past Medical History:        Diagnosis Date    Acoustic neuroma ([address]     Allergic rhinitis     BCC (basal cell carcinoma of skin) 2018    right calf    Gilbert disease     Raynaud disease        Past Surgical History:        Procedure Laterality Date    HAIR TRANSPLANT      VARICOCELECTOMY         Social History:    Social History     Tobacco Use    Smoking status: Former     Types: Cigars    Smokeless tobacco: Never   Substance Use Topics    Alcohol use: Yes     Comment: socially                                Counseling given: Not Answered      Vital Signs (Current):   Vitals:    03/31/23 1238 04/05/23 0609   BP:  122/72   Pulse:  64   Resp:  16   Temp:  97.8 °F (36.6 °C)   TempSrc:  Temporal   SpO2:  97%   Weight: 208 lb (94.3 kg)    Height: 6' 4\" (1.93 m)                                               BP Readings from Last 3 Encounters:   04/05/23 122/72   03/13/23 120/74   01/17/23 115/73       NPO Status: Time of last liquid consumption: 2100                        Time of last solid consumption: 2100                        Date of last liquid consumption: 04/04/23                        Date of last solid food consumption: 04/04/23    BMI:   Wt Readings from Last 3 Encounters:   03/31/23 208 lb (94.3 kg)   03/13/23 210 lb 12.8 oz (95.6 kg)   01/17/23 211 lb (95.7 kg)     Body mass index is 25.32 kg/m².     CBC:   Lab Results   Component Value Date/Time    WBC 6.0 03/10/2023 09:28 AM    RBC 4.73 03/10/2023 09:28 AM    HGB 15.2 03/10/2023 09:28 AM    HCT 43.5 03/10/2023 09:28 AM    MCV 91.9 03/10/2023 09:28 AM    RDW 13.6 03/10/2023 09:28 AM     03/10/2023 09:28 AM       CMP:   Lab Results

## 2023-04-06 ENCOUNTER — APPOINTMENT (OUTPATIENT)
Dept: MRI IMAGING | Age: 38
DRG: 027 | End: 2023-04-06
Attending: NEUROLOGICAL SURGERY
Payer: COMMERCIAL

## 2023-04-06 PROBLEM — Z98.890 S/P CRANIOTOMY: Status: ACTIVE | Noted: 2023-04-06

## 2023-04-06 LAB
ANION GAP SERPL CALCULATED.3IONS-SCNC: 9 MMOL/L (ref 3–16)
BUN SERPL-MCNC: 9 MG/DL (ref 7–20)
CALCIUM SERPL-MCNC: 9.2 MG/DL (ref 8.3–10.6)
CHLORIDE SERPL-SCNC: 105 MMOL/L (ref 99–110)
CO2 SERPL-SCNC: 26 MMOL/L (ref 21–32)
CREAT SERPL-MCNC: 0.8 MG/DL (ref 0.9–1.3)
DEPRECATED RDW RBC AUTO: 13.2 % (ref 12.4–15.4)
GFR SERPLBLD CREATININE-BSD FMLA CKD-EPI: >60 ML/MIN/{1.73_M2}
GLUCOSE SERPL-MCNC: 135 MG/DL (ref 70–99)
HCT VFR BLD AUTO: 40.7 % (ref 40.5–52.5)
HGB BLD-MCNC: 14.3 G/DL (ref 13.5–17.5)
MCH RBC QN AUTO: 32.5 PG (ref 26–34)
MCHC RBC AUTO-ENTMCNC: 35.3 G/DL (ref 31–36)
MCV RBC AUTO: 92.1 FL (ref 80–100)
PLATELET # BLD AUTO: 136 K/UL (ref 135–450)
PMV BLD AUTO: 9.1 FL (ref 5–10.5)
POTASSIUM SERPL-SCNC: 4.2 MMOL/L (ref 3.5–5.1)
RBC # BLD AUTO: 4.42 M/UL (ref 4.2–5.9)
SODIUM SERPL-SCNC: 140 MMOL/L (ref 136–145)
WBC # BLD AUTO: 12.3 K/UL (ref 4–11)

## 2023-04-06 PROCEDURE — 97161 PT EVAL LOW COMPLEX 20 MIN: CPT

## 2023-04-06 PROCEDURE — 6370000000 HC RX 637 (ALT 250 FOR IP): Performed by: NURSE PRACTITIONER

## 2023-04-06 PROCEDURE — 2580000003 HC RX 258

## 2023-04-06 PROCEDURE — 80048 BASIC METABOLIC PNL TOTAL CA: CPT

## 2023-04-06 PROCEDURE — 6360000002 HC RX W HCPCS: Performed by: NURSE PRACTITIONER

## 2023-04-06 PROCEDURE — 85027 COMPLETE CBC AUTOMATED: CPT

## 2023-04-06 PROCEDURE — 2060000000 HC ICU INTERMEDIATE R&B

## 2023-04-06 PROCEDURE — A9579 GAD-BASE MR CONTRAST NOS,1ML: HCPCS | Performed by: NURSE PRACTITIONER

## 2023-04-06 PROCEDURE — 97116 GAIT TRAINING THERAPY: CPT

## 2023-04-06 PROCEDURE — 36415 COLL VENOUS BLD VENIPUNCTURE: CPT

## 2023-04-06 PROCEDURE — 99024 POSTOP FOLLOW-UP VISIT: CPT | Performed by: NURSE PRACTITIONER

## 2023-04-06 PROCEDURE — APPNB30 APP NON BILLABLE TIME 0-30 MINS: Performed by: NURSE PRACTITIONER

## 2023-04-06 PROCEDURE — 6360000004 HC RX CONTRAST MEDICATION: Performed by: NURSE PRACTITIONER

## 2023-04-06 PROCEDURE — 6360000002 HC RX W HCPCS

## 2023-04-06 PROCEDURE — 97530 THERAPEUTIC ACTIVITIES: CPT

## 2023-04-06 PROCEDURE — 97165 OT EVAL LOW COMPLEX 30 MIN: CPT

## 2023-04-06 PROCEDURE — 97535 SELF CARE MNGMENT TRAINING: CPT

## 2023-04-06 PROCEDURE — 2580000003 HC RX 258: Performed by: NURSE PRACTITIONER

## 2023-04-06 PROCEDURE — 70553 MRI BRAIN STEM W/O & W/DYE: CPT

## 2023-04-06 RX ORDER — DIAZEPAM 5 MG/1
5 TABLET ORAL EVERY 6 HOURS PRN
Qty: 40 TABLET | Refills: 0 | Status: CANCELLED | OUTPATIENT
Start: 2023-04-06 | End: 2023-04-16

## 2023-04-06 RX ORDER — OXYCODONE HYDROCHLORIDE 5 MG/1
5 TABLET ORAL EVERY 6 HOURS PRN
Qty: 28 TABLET | Refills: 0 | Status: CANCELLED | OUTPATIENT
Start: 2023-04-06 | End: 2023-04-13

## 2023-04-06 RX ORDER — SENNA AND DOCUSATE SODIUM 50; 8.6 MG/1; MG/1
1 TABLET, FILM COATED ORAL 2 TIMES DAILY
Status: CANCELLED | COMMUNITY
Start: 2023-04-06

## 2023-04-06 RX ORDER — POLYETHYLENE GLYCOL 3350 17 G/17G
17 POWDER, FOR SOLUTION ORAL DAILY
Qty: 527 G | Refills: 1 | Status: CANCELLED | COMMUNITY
Start: 2023-04-07 | End: 2023-05-07

## 2023-04-06 RX ORDER — DEXAMETHASONE 4 MG/1
4 TABLET ORAL DAILY
Qty: 1 TABLET | Refills: 0 | Status: CANCELLED | OUTPATIENT
Start: 2023-04-09 | End: 2023-04-10

## 2023-04-06 RX ORDER — SCOLOPAMINE TRANSDERMAL SYSTEM 1 MG/1
1 PATCH, EXTENDED RELEASE TRANSDERMAL
Qty: 1 PATCH | Refills: 0 | Status: CANCELLED | OUTPATIENT
Start: 2023-04-08

## 2023-04-06 RX ORDER — METHOCARBAMOL 750 MG/1
750 TABLET, FILM COATED ORAL 4 TIMES DAILY
Qty: 40 TABLET | Refills: 0 | Status: CANCELLED | OUTPATIENT
Start: 2023-04-06 | End: 2023-04-16

## 2023-04-06 RX ADMIN — SODIUM CHLORIDE, PRESERVATIVE FREE 10 ML: 5 INJECTION INTRAVENOUS at 08:23

## 2023-04-06 RX ADMIN — OXYCODONE 5 MG: 5 TABLET ORAL at 12:22

## 2023-04-06 RX ADMIN — SODIUM CHLORIDE, POTASSIUM CHLORIDE, SODIUM LACTATE AND CALCIUM CHLORIDE: 600; 310; 30; 20 INJECTION, SOLUTION INTRAVENOUS at 00:14

## 2023-04-06 RX ADMIN — DEXAMETHASONE 4 MG: 4 TABLET ORAL at 11:50

## 2023-04-06 RX ADMIN — OXYCODONE 10 MG: 5 TABLET ORAL at 20:54

## 2023-04-06 RX ADMIN — FLUTICASONE PROPIONATE 1 SPRAY: 50 SPRAY, METERED NASAL at 08:21

## 2023-04-06 RX ADMIN — METHOCARBAMOL 1000 MG: 100 INJECTION INTRAMUSCULAR; INTRAVENOUS at 07:39

## 2023-04-06 RX ADMIN — ACETAMINOPHEN 1000 MG: 500 TABLET ORAL at 17:59

## 2023-04-06 RX ADMIN — CEFAZOLIN 2000 MG: 2 INJECTION, POWDER, FOR SOLUTION INTRAMUSCULAR; INTRAVENOUS at 10:45

## 2023-04-06 RX ADMIN — CEFAZOLIN 2000 MG: 2 INJECTION, POWDER, FOR SOLUTION INTRAMUSCULAR; INTRAVENOUS at 17:17

## 2023-04-06 RX ADMIN — SENNOSIDES AND DOCUSATE SODIUM 1 TABLET: 50; 8.6 TABLET ORAL at 20:56

## 2023-04-06 RX ADMIN — GADOTERIDOL 20 ML: 279.3 INJECTION, SOLUTION INTRAVENOUS at 09:53

## 2023-04-06 RX ADMIN — HEPARIN SODIUM 5000 UNITS: 5000 INJECTION INTRAVENOUS; SUBCUTANEOUS at 20:56

## 2023-04-06 RX ADMIN — HEPARIN SODIUM 5000 UNITS: 5000 INJECTION INTRAVENOUS; SUBCUTANEOUS at 14:08

## 2023-04-06 RX ADMIN — OXYCODONE 5 MG: 5 TABLET ORAL at 17:14

## 2023-04-06 RX ADMIN — DEXAMETHASONE 4 MG: 4 TABLET ORAL at 06:16

## 2023-04-06 RX ADMIN — METHOCARBAMOL 750 MG: 750 TABLET ORAL at 20:54

## 2023-04-06 RX ADMIN — METHOCARBAMOL 750 MG: 750 TABLET ORAL at 17:14

## 2023-04-06 RX ADMIN — METHOCARBAMOL 1000 MG: 100 INJECTION INTRAMUSCULAR; INTRAVENOUS at 00:19

## 2023-04-06 RX ADMIN — CEFAZOLIN 2000 MG: 2 INJECTION, POWDER, FOR SOLUTION INTRAMUSCULAR; INTRAVENOUS at 01:36

## 2023-04-06 RX ADMIN — ACETAMINOPHEN 1000 MG: 500 TABLET ORAL at 00:04

## 2023-04-06 RX ADMIN — HYDROMORPHONE HYDROCHLORIDE 0.5 MG: 1 INJECTION, SOLUTION INTRAMUSCULAR; INTRAVENOUS; SUBCUTANEOUS at 06:15

## 2023-04-06 RX ADMIN — ACETAMINOPHEN 1000 MG: 500 TABLET ORAL at 11:50

## 2023-04-06 RX ADMIN — OXYCODONE 10 MG: 5 TABLET ORAL at 04:05

## 2023-04-06 RX ADMIN — ACETAMINOPHEN 1000 MG: 500 TABLET ORAL at 06:16

## 2023-04-06 RX ADMIN — DEXAMETHASONE 4 MG: 4 TABLET ORAL at 00:05

## 2023-04-06 RX ADMIN — OXYCODONE 10 MG: 5 TABLET ORAL at 08:18

## 2023-04-06 RX ADMIN — SODIUM CHLORIDE, PRESERVATIVE FREE 10 ML: 5 INJECTION INTRAVENOUS at 20:58

## 2023-04-06 RX ADMIN — DEXAMETHASONE 4 MG: 4 TABLET ORAL at 17:14

## 2023-04-06 ASSESSMENT — PAIN DESCRIPTION - ORIENTATION
ORIENTATION: LEFT

## 2023-04-06 ASSESSMENT — PAIN SCALES - GENERAL
PAINLEVEL_OUTOF10: 4
PAINLEVEL_OUTOF10: 3
PAINLEVEL_OUTOF10: 7
PAINLEVEL_OUTOF10: 3
PAINLEVEL_OUTOF10: 7
PAINLEVEL_OUTOF10: 6
PAINLEVEL_OUTOF10: 5
PAINLEVEL_OUTOF10: 4
PAINLEVEL_OUTOF10: 7
PAINLEVEL_OUTOF10: 6
PAINLEVEL_OUTOF10: 4
PAINLEVEL_OUTOF10: 4
PAINLEVEL_OUTOF10: 7
PAINLEVEL_OUTOF10: 2

## 2023-04-06 ASSESSMENT — PAIN DESCRIPTION - LOCATION
LOCATION: HEAD
LOCATION: INCISION
LOCATION: HEAD

## 2023-04-06 ASSESSMENT — PAIN DESCRIPTION - DESCRIPTORS
DESCRIPTORS: SHARP
DESCRIPTORS: DISCOMFORT
DESCRIPTORS: ACHING
DESCRIPTORS: ACHING
DESCRIPTORS: ACHING;DISCOMFORT
DESCRIPTORS: SHARP
DESCRIPTORS: ACHING

## 2023-04-06 ASSESSMENT — PAIN DESCRIPTION - DIRECTION: RADIATING_TOWARDS: NECK

## 2023-04-06 ASSESSMENT — PAIN DESCRIPTION - ONSET: ONSET: ON-GOING

## 2023-04-06 ASSESSMENT — PAIN DESCRIPTION - PAIN TYPE: TYPE: SURGICAL PAIN

## 2023-04-06 ASSESSMENT — PAIN - FUNCTIONAL ASSESSMENT
PAIN_FUNCTIONAL_ASSESSMENT: PREVENTS OR INTERFERES SOME ACTIVE ACTIVITIES AND ADLS
PAIN_FUNCTIONAL_ASSESSMENT: PREVENTS OR INTERFERES SOME ACTIVE ACTIVITIES AND ADLS

## 2023-04-06 ASSESSMENT — PAIN DESCRIPTION - FREQUENCY: FREQUENCY: CONTINUOUS

## 2023-04-06 ASSESSMENT — PAIN SCALES - WONG BAKER: WONGBAKER_NUMERICALRESPONSE: 2

## 2023-04-06 NOTE — CONSULTS
times weekly      Multiple Vitamins-Minerals (MULTIVITAMIN ADULT PO) Take 1 tablet by mouth daily         Allergies:  Amoxicillin    Social History:   TOBACCO:   reports that he has quit smoking. His smoking use included cigars. He has never used smokeless tobacco.     ETOH:   reports current alcohol use. Family History:       Problem Relation Age of Onset    Cancer Paternal Grandfather         melanoma       ROS: Review of Systems -per HPI. All other systems reviewed and are negative. PHYSICAL EXAM:  /88   Pulse 62   Temp 98.2 °F (36.8 °C) (Axillary)   Resp 15   Ht 6' 4\" (1.93 m)   Wt 202 lb 13.2 oz (92 kg) Comment: estimation, bed scale does not work  SpO2 100%   BMI 24.69 kg/m²     General appearance: alert, appears stated age, and cooperative  Head:  left scalp w/ dressing  Neck: supple, symmetrical, trachea midline  Lungs: clear to auscultation bilaterally  Heart: regular rate and rhythm  Abdomen: normal findings: soft, non-tender  Extremities: extremities normal, atraumatic, no cyanosis or edema  Neurologic: Grossly normal    LABS:  Recent Labs     04/06/23  0535   WBC 12.3*   HGB 14.3   HCT 40.7                                                                     Recent Labs     04/06/23  0535      K 4.2      CO2 26   BUN 9   CREATININE 0.8*   GLUCOSE 135*       Assessment & Plan:      Acoustic neuroma (HCC)  D33.3 Surgical Pathology   POD 1 s/p \"LEFT RESTROSIGMOID CRANIOTOMY FOR RESECTION OF VESTIBULAR SCHWANOMMA (Left)\"    BPH:  Monitor for post-op retention. Cont proscar. Allergic rhinitis:  Cont intranasal steroid and antihistamine. Thank you Dr. Kathie Perkins. Isabel Villeda MD for the opportunity to be involved in this patients care.

## 2023-04-07 VITALS
HEIGHT: 76 IN | OXYGEN SATURATION: 98 % | WEIGHT: 202.82 LBS | RESPIRATION RATE: 16 BRPM | DIASTOLIC BLOOD PRESSURE: 88 MMHG | BODY MASS INDEX: 24.7 KG/M2 | HEART RATE: 61 BPM | TEMPERATURE: 98 F | SYSTOLIC BLOOD PRESSURE: 133 MMHG

## 2023-04-07 PROCEDURE — 97116 GAIT TRAINING THERAPY: CPT

## 2023-04-07 PROCEDURE — 6370000000 HC RX 637 (ALT 250 FOR IP): Performed by: NURSE PRACTITIONER

## 2023-04-07 PROCEDURE — 2580000003 HC RX 258: Performed by: NURSE PRACTITIONER

## 2023-04-07 PROCEDURE — 97535 SELF CARE MNGMENT TRAINING: CPT

## 2023-04-07 PROCEDURE — 2580000003 HC RX 258

## 2023-04-07 PROCEDURE — 99024 POSTOP FOLLOW-UP VISIT: CPT | Performed by: NURSE PRACTITIONER

## 2023-04-07 PROCEDURE — 6360000002 HC RX W HCPCS

## 2023-04-07 PROCEDURE — APPNB30 APP NON BILLABLE TIME 0-30 MINS: Performed by: NURSE PRACTITIONER

## 2023-04-07 PROCEDURE — 97530 THERAPEUTIC ACTIVITIES: CPT

## 2023-04-07 PROCEDURE — 6360000002 HC RX W HCPCS: Performed by: NURSE PRACTITIONER

## 2023-04-07 RX ORDER — SENNA AND DOCUSATE SODIUM 50; 8.6 MG/1; MG/1
1 TABLET, FILM COATED ORAL 2 TIMES DAILY
Qty: 30 TABLET | Refills: 0 | Status: SHIPPED | OUTPATIENT
Start: 2023-04-07 | End: 2023-04-07 | Stop reason: SDUPTHER

## 2023-04-07 RX ORDER — SENNA AND DOCUSATE SODIUM 50; 8.6 MG/1; MG/1
1 TABLET, FILM COATED ORAL 2 TIMES DAILY
Qty: 30 TABLET | Refills: 0 | Status: SHIPPED | OUTPATIENT
Start: 2023-04-07 | End: 2023-04-22

## 2023-04-07 RX ORDER — DEXAMETHASONE 4 MG/1
TABLET ORAL
Qty: 12 TABLET | Refills: 0 | Status: SHIPPED | OUTPATIENT
Start: 2023-04-07 | End: 2023-04-13

## 2023-04-07 RX ORDER — OXYCODONE HYDROCHLORIDE 5 MG/1
5 TABLET ORAL EVERY 6 HOURS PRN
Qty: 28 TABLET | Refills: 0 | Status: SHIPPED | OUTPATIENT
Start: 2023-04-07 | End: 2023-04-07 | Stop reason: SDUPTHER

## 2023-04-07 RX ORDER — OXYCODONE HYDROCHLORIDE 5 MG/1
5 TABLET ORAL EVERY 6 HOURS PRN
Qty: 28 TABLET | Refills: 0 | Status: SHIPPED | OUTPATIENT
Start: 2023-04-07 | End: 2023-04-14

## 2023-04-07 RX ORDER — POLYETHYLENE GLYCOL 3350 17 G/17G
17 POWDER, FOR SOLUTION ORAL DAILY
Qty: 15 EACH | Refills: 0 | Status: SHIPPED | OUTPATIENT
Start: 2023-04-08 | End: 2023-04-23

## 2023-04-07 RX ORDER — POLYETHYLENE GLYCOL 3350 17 G/17G
17 POWDER, FOR SOLUTION ORAL DAILY
Qty: 15 EACH | Refills: 0 | Status: SHIPPED | OUTPATIENT
Start: 2023-04-08 | End: 2023-04-07 | Stop reason: SDUPTHER

## 2023-04-07 RX ORDER — DEXAMETHASONE 4 MG/1
TABLET ORAL
Qty: 12 TABLET | Refills: 0 | Status: SHIPPED | OUTPATIENT
Start: 2023-04-07 | End: 2023-04-07 | Stop reason: SDUPTHER

## 2023-04-07 RX ADMIN — ACETAMINOPHEN 1000 MG: 500 TABLET ORAL at 00:28

## 2023-04-07 RX ADMIN — SODIUM CHLORIDE, PRESERVATIVE FREE 10 ML: 5 INJECTION INTRAVENOUS at 08:52

## 2023-04-07 RX ADMIN — SENNOSIDES AND DOCUSATE SODIUM 1 TABLET: 50; 8.6 TABLET ORAL at 08:51

## 2023-04-07 RX ADMIN — METHOCARBAMOL 750 MG: 750 TABLET ORAL at 08:52

## 2023-04-07 RX ADMIN — SODIUM CHLORIDE: 9 INJECTION, SOLUTION INTRAVENOUS at 09:11

## 2023-04-07 RX ADMIN — HEPARIN SODIUM 5000 UNITS: 5000 INJECTION INTRAVENOUS; SUBCUTANEOUS at 06:09

## 2023-04-07 RX ADMIN — DEXAMETHASONE 4 MG: 4 TABLET ORAL at 06:10

## 2023-04-07 RX ADMIN — HEPARIN SODIUM 5000 UNITS: 5000 INJECTION INTRAVENOUS; SUBCUTANEOUS at 13:36

## 2023-04-07 RX ADMIN — METHOCARBAMOL 750 MG: 750 TABLET ORAL at 13:36

## 2023-04-07 RX ADMIN — OXYCODONE 10 MG: 5 TABLET ORAL at 01:05

## 2023-04-07 RX ADMIN — OXYCODONE 10 MG: 5 TABLET ORAL at 14:39

## 2023-04-07 RX ADMIN — CEFAZOLIN 2000 MG: 2 INJECTION, POWDER, FOR SOLUTION INTRAMUSCULAR; INTRAVENOUS at 09:14

## 2023-04-07 RX ADMIN — POLYETHYLENE GLYCOL (3350) 17 G: 17 POWDER, FOR SOLUTION ORAL at 08:52

## 2023-04-07 RX ADMIN — OXYCODONE 10 MG: 5 TABLET ORAL at 06:16

## 2023-04-07 RX ADMIN — CEFAZOLIN 2000 MG: 2 INJECTION, POWDER, FOR SOLUTION INTRAMUSCULAR; INTRAVENOUS at 01:12

## 2023-04-07 RX ADMIN — ACETAMINOPHEN 1000 MG: 500 TABLET ORAL at 13:36

## 2023-04-07 RX ADMIN — DEXAMETHASONE 4 MG: 4 TABLET ORAL at 13:36

## 2023-04-07 RX ADMIN — OXYCODONE 10 MG: 5 TABLET ORAL at 10:27

## 2023-04-07 ASSESSMENT — PAIN SCALES - GENERAL
PAINLEVEL_OUTOF10: 5
PAINLEVEL_OUTOF10: 3
PAINLEVEL_OUTOF10: 4
PAINLEVEL_OUTOF10: 7
PAINLEVEL_OUTOF10: 7
PAINLEVEL_OUTOF10: 3
PAINLEVEL_OUTOF10: 6

## 2023-04-07 ASSESSMENT — PAIN DESCRIPTION - LOCATION
LOCATION: NECK
LOCATION: EAR
LOCATION: HEAD
LOCATION: NECK
LOCATION: NECK

## 2023-04-07 ASSESSMENT — PAIN DESCRIPTION - PAIN TYPE
TYPE: SURGICAL PAIN

## 2023-04-07 ASSESSMENT — PAIN DESCRIPTION - ONSET
ONSET: GRADUAL
ONSET: GRADUAL
ONSET: ON-GOING
ONSET: GRADUAL

## 2023-04-07 ASSESSMENT — PAIN DESCRIPTION - FREQUENCY
FREQUENCY: INTERMITTENT

## 2023-04-07 ASSESSMENT — PAIN DESCRIPTION - DESCRIPTORS
DESCRIPTORS: DISCOMFORT
DESCRIPTORS: ACHING
DESCRIPTORS: DISCOMFORT

## 2023-04-07 ASSESSMENT — PAIN DESCRIPTION - ORIENTATION
ORIENTATION: LEFT

## 2023-04-07 ASSESSMENT — PAIN - FUNCTIONAL ASSESSMENT
PAIN_FUNCTIONAL_ASSESSMENT: ACTIVITIES ARE NOT PREVENTED

## 2023-04-07 NOTE — DISCHARGE SUMMARY
Discharge Summary    Date of Admission: 4/5/2023  5:40 AM  Date of Discharge: 4/7/23  Admission Diagnosis: Acoustic neuroma [facility]) [D33.3]  Discharge Diagnosis: Same   Condition on Discharge: good  Attending for Admission: Sathish Chavez. Aniya Lima MD  Procedures: Procedure(s) (LRB):  LEFT RESTROSIGMOID CRANIOTOMY FOR RESECTION OF VESTIBULAR SCHWANOMMA (Left)  Consults: IP CONSULT TO NEUROCRITICAL CARE  IP CONSULT TO HOSPITALIST    Reason for Admission:  Julio Montero is a 44 y.o. male patient who was admitted to the hospital for complaints of hearing loss on L with vestibular schwannoma. He underwent the procedure listed above on 4/5/23. Hospital Course:  After surgery, His pre-operative hearing loss persisted. He had some dizziness which has gone away. He complained of incisional pain. The pain was well-controlled on oral medications. The incision was clean, dry and intact. There was no erythema or edema around the surgical site. Prior to discharge He was eating well, urinating and ambulating with a steady gait.     Discharge Vitals/Labs:  /88   Pulse 61   Temp 98 °F (36.7 °C) (Oral)   Resp 16   Ht 6' 4\" (1.93 m)   Wt 202 lb 13.2 oz (92 kg) Comment: estimation, bed scale does not work  SpO2 98%   BMI 24.69 kg/m²   CBC:   Lab Results   Component Value Date/Time    WBC 12.3 04/06/2023 05:35 AM    RBC 4.42 04/06/2023 05:35 AM    HGB 14.3 04/06/2023 05:35 AM    HCT 40.7 04/06/2023 05:35 AM    MCV 92.1 04/06/2023 05:35 AM    MCH 32.5 04/06/2023 05:35 AM    MCHC 35.3 04/06/2023 05:35 AM    RDW 13.2 04/06/2023 05:35 AM     04/06/2023 05:35 AM    MPV 9.1 04/06/2023 05:35 AM     CMP:    Lab Results   Component Value Date/Time     04/06/2023 05:35 AM    K 4.2 04/06/2023 05:35 AM     04/06/2023 05:35 AM    CO2 26 04/06/2023 05:35 AM    BUN 9 04/06/2023 05:35 AM    CREATININE 0.8 04/06/2023 05:35 AM    GFRAA >60 05/04/2022 07:55 AM    AGRATIO 2.0 03/10/2023 09:28 AM    LABGLOM >60

## 2023-04-07 NOTE — PLAN OF CARE
Problem: Discharge Planning  Goal: Discharge to home or other facility with appropriate resources  4/7/2023 1156 by Alondra Nicole RN  Outcome: Adequate for Discharge  4/7/2023 0318 by Louis Thomas RN  Outcome: Progressing  4/6/2023 2333 by Meg Smith RN  Outcome: Progressing     Problem: Pain  Goal: Verbalizes/displays adequate comfort level or baseline comfort level  4/7/2023 1156 by Alondra Nicole RN  Outcome: Adequate for Discharge  4/7/2023 0807 by Alondra Nicole RN  Outcome: Progressing  Note: Numeric pain scale being used. PRN oxycodone given for pain control. Will continue to assess and monitor. 4/7/2023 0807 by Alondra Nicole RN  Outcome: Progressing  4/7/2023 0318 by Louis Thomas RN  Outcome: Progressing  4/6/2023 2333 by Meg Smith RN  Outcome: Progressing     Problem: ABCDS Injury Assessment  Goal: Absence of physical injury  Outcome: Adequate for Discharge     Problem: Safety - Adult  Goal: Free from fall injury  4/7/2023 1156 by Alondra Nicole RN  Outcome: Adequate for Discharge  4/7/2023 0807 by Alondra Nicole RN  Outcome: Progressing  Note: Pt ambulating in [place] w therapy. Gb utilized. Gripper socks on. All fall precautions in place.

## 2023-04-07 NOTE — CARE COORDINATION
representative Jose Angel Mendez and his family were provided with a choice of provider and agrees with the discharge plan Yes    Freedom of choice list was provided with basic dialogue that supports the patient's individualized plan of care/goals and shares the quality data associated with the providers.  Yes    Care Transitions patient: No    Leif Lafleur RN  [facility]  Case Management Department  Ph: [phone]  Fax: [phone]

## 2023-04-07 NOTE — PLAN OF CARE
Problem: Pain  Goal: Verbalizes/displays adequate comfort level or baseline comfort level  4/7/2023 0807 by Horacio Ibarra RN  Outcome: Progressing  Note: Numeric pain scale being used. PRN oxycodone given for pain control. Will continue to assess and monitor. Problem: Safety - Adult  Goal: Free from fall injury  Outcome: Progressing  Note: Pt ambulating in [place] w therapy. Gb utilized. Gripper socks on. All fall precautions in place.

## 2023-04-07 NOTE — PLAN OF CARE
Problem: Discharge Planning  Goal: Discharge to home or other facility with appropriate resources  4/7/2023 0318 by Clearance BOLIVAR Hernandez  Outcome: Progressing  4/6/2023 2333 by Javi Haley RN  Outcome: Progressing     Problem: Pain  Goal: Verbalizes/displays adequate comfort level or baseline comfort level  4/7/2023 0318 by Clearance BOLIVAR Hernandez  Outcome: Progressing  4/6/2023 2333 by Javi Haley RN  Outcome: Progressing

## 2023-04-07 NOTE — PROGRESS NOTES
1500 Pt restless in bed encouraged to rest and  reoriented to time, place and situation Edward from [facility] stoppred by on rounds examined pt gaze and Smile no new orders at this time     1508 Pt is now sleeping
4 Eyes Admission Assessment     I agree as the admission nurse that 2 RN's have performed a thorough Head to Toe Skin Assessment on the patient. ALL assessment sites listed below have been assessed on admission. Areas assessed by both nurses: Yes  [x]   Head, Face, and Ears   [x]   Shoulders, Back, and Chest  [x]   Arms, Elbows, and Hands   [x]   Coccyx, Sacrum, and Ischium  [x]   Legs, Feet, and Heels        Does the Patient have Skin Breakdown?   NO        Amarjit Prevention initiated:  No   Wound Care Orders initiated:  No      [facility] nurse consulted for Pressure Injury (Stage 3,4, Unstageable, DTI, NWPT, and Complex wounds) or Amarjit score 18 or lower:  No      Pt has incision behind his L ear    Nurse 1 eSignature: Electronically signed by Marily Post RN on 4/5/23 at 5:57 PM EDT    **SHARE this note so that the co-signing nurse is able to place an eSignature**    Nurse 2 eSignature: Electronically signed by Carol Ross RN on 4/5/23 at 7:24 PM EDT
All discharge instructions reviewed w pt and spouse. X2 IV removed w/o complication. All questions and concerns addressed at this time.
From OR sedated, dressing CDI, /91, other VSS. Report from [facility] and [facility].     S/P LEFT RESTROSIGMOID CRANIOTOMY FOR RESECTION OF VESTIBULAR SCHWANOMMA
NEUROSURGERY PROGRESS NOTE    4/6/2023 8:15 AM                               Neftaly Cerda                      LOS: 1 day   POD# 1 s/p Procedure(s) (LRB):  LEFT RESTROSIGMOID CRANIOTOMY FOR RESECTION OF VESTIBULAR SCHWANOMMA (Left)    Subjective: Patient laying in bed upon entering the room. No acute events overnight. Patient c/o dizziness and nausea, but no episodes of vomiting. Physical Exam:  Patient seen and examined    Vitals:    04/06/23 0715   BP: 128/80   Pulse: 68   Resp: 20   Temp:    SpO2: 97%     GCS:  4 - Opens eyes on own  5 - Alert and oriented  6 - Follows simple motor commands  General: Well developed. Alert and cooperative in no acute distress. HENT: atraumatic, neck supple  Eyes: Optic discs: Not tested  Pulmonary: unlabored respiratory effort  Cardiovascular:  Warm well perfused. No peripheral edema  Gastrointestinal: abdomen soft, NT, ND    Neurological:  Mental Status: Awake, alert, oriented x 4, speech clear and appropriate  Attention: Intact  Language: No aphasia or dysarthria noted  Sensation: Intact to all extremities to light touch  Coordination: Intact    Cranial Nerves:  II: Visual acuity not tested, denies new visual changes / diplopia  III, IV, VI: PERRL, 3 mm bilaterally, EOMI, no nystagmus noted  V: Facial sensation intact bilaterally to touch  VII: Face symmetric  VIII: Hearing intact bilaterally to spoken voice  IX: Palate movement equal bilaterally  XI: Shoulder shrug equal bilaterally  XII: Tongue midline    Musculoskeletal:   Gait: Not tested   Assist devices: None   Tone: Normal  Motor strength:    Right  Left    Right  Left    Deltoid  5 5  Hip Flex  5 5   Biceps  5 5  Knee Extensors  5 5   Triceps  5 5  Knee Flexors  5 5   Wrist Ext  5 5  Ankle Dorsiflex. 5 5   Wrist Flex  5 5  Ankle Plantarflex.   5 5   Handgrip  5 5  Ext Acosta Longus  5 5   Thumb Ext  5 5         Incision: CDI    Radiological Findings:  CT Head wo Contrast  Result Date: 4/5/2023  Postsurgical
Neuro intact w/o focal deficit. Surgical incisional drsg in place C/D/I.  AA&Ox4, VSS, NAD, denies pain. 5/5 muscle strength all extremities,   Pt endorses lightheadedness and nausea. Spouse at bedside, helped completion of MRI checklist.  Patient and family prefer to have MRI done 8am tomorrow morning. Will continue to monitor.
Occupational Therapy  Facility/Department: [facility] ICU  Occupational Therapy Initial Assessment/Treatment    Name: Tristin Sarkar  : 1985  MRN: [id number]  Date of Service: 2023    Discharge Recommendations:  Tristin Sarkar scored a 20/24 on the AM-PAC ADL Inpatient form. Current research shows that an AM-PAC score of 18 or greater is typically associated with a discharge to the patient's home setting. Please see assessment section for further patient specific details. If patient discharges prior to next session this note will serve as a discharge summary. Please see below for the latest assessment towards goals. OT Equipment Recommendations  Equipment Needed: No  Other: pt reports having needed DME at home       Patient Diagnosis(es): The primary encounter diagnosis was S/P craniotomy. A diagnosis of Acoustic neuroma [facility]) was also pertinent to this visit. Past Medical History:  has a past medical history of Acoustic neuroma ([address] Allergic rhinitis, BCC (basal cell carcinoma of skin), Gilbert disease, and Raynaud disease. Past Surgical History:  has a past surgical history that includes Varicocelectomy; Hair transplant; and Neuroma surgery (Left, 2023). Treatment Diagnosis: impaired ADLs and functional mobility/transfers      Assessment   Performance deficits / Impairments: Decreased functional mobility ; Decreased endurance;Decreased ADL status; Decreased balance;Decreased high-level IADLs  Assessment: Pt is a 43 y/o M who presents s/p LEFT RESTROSIGMOID CRANIOTOMY FOR RESECTION OF VESTIBULAR SCHWANOMMA. Pt is from home w/ spouse and reports being IND with all ADLs, transfers, and functional mobility w/o AD pta. Pt also working fulltime and driving pta. Pt presents slightly below functional baseline, requiring SBA for transfers and SBA-CGA for functional mobility w/o AD. Pt also demo standing level ADLs w/ SBA.  Pt also able to negotiate steps w/o difficulty and experienced no
Occupational Therapy  Occupational Therapy  Daily Treatment Note  Patient Name: Jacky Ramirez  MRN: [id number]    Chart Reviewed: Yes       Other position/activity restrictions: amb pt     Additional Pertinent Hx: pt is a 45 yo female s/p LEFT RESTROSIGMOID CRANIOTOMY FOR RESECTION OF VESTIBULAR SCHWANOMMA      Diagnosis: Acoustic neuroma  Treatment Diagnosis: impaired ADLs and functional mobility/transfers    Subjective: Pt supine in bed with BLEs off side of bed due to pt 6'4\", asked RN for bed extender. Pt is very pleasant, motivated for therapy and eager to return to [facility]. General Comments: Pt supine to sit Mod I. Pt sit to stand Supervision and ambulated SBA no device to table to retrieve contact lenses and then entered bathroom ~40ft. Pt in stance at sink Mod I for oral care and wash face (pt also put in contact lenses). Pt toilet transfer and toileted Mod I. Pt ambulated SBA no device to sofa ~15ft and stand to sit Supervision. Pt donned UB clothing Independent (retrieving from duffle bag) and donned briefs/pants and pulled up in stance Mod I. Pt ambulated back to bathroom ~15ft to comb hair at sink. Pt ambulated in hallway SBA no device ~200ft x 2 trials with seated rest break. Pt reports less dizziness than yesterday. Pt stand to sit Supervision in recliner. Call light in reach and chair alarm on. Pain: \"Just hurts at the incision and my neck is a little stiff. I already got pain meds. \" Pt didn't rate    Social/Functional History  Lives With: Spouse  Type of Home: House  Home Layout: Two level  Home Access: Stairs to enter without rails  Entrance Stairs - Number of Steps: 2 dalia  Bathroom Shower/Tub: Tub/Shower unit (shower stool)  Bathroom Toilet: Standard  Home Equipment: chris Vincent Cane  Has the patient had two or more falls in the past year or any fall with injury in the past year?: No  ADL Assistance: Independent  Homemaking Assistance: Independent  Ambulation Assistance:
PACU Transfer Note    Vitals:    04/05/23 1530   BP: 135/78   Pulse: 79   Resp: 15   Temp: 98.4 °F (36.9 °C)   SpO2: 100%    Will take patient for CT scan enroute to ICU    In: 3300 [I.V.:3300]  Out: 2300 [Urine:2200]    Pain assessment:    Pain Level: 0    Report given to Receiving unit BOLIVAR Cooper.     4/5/2023 3:52 PM
PRE-OP INSTRUCTIONS FOR SURGICAL PATIENTS          Our Pre-admission Testing Nurses tried and were unable to reach you today. Please read the attached instructions if you did not listen to your voicemail. Follow all instructions provided to you from your surgeon's office, including your ARRIVAL TIME. Arrange for someone to drive you home and be with you for the first 24 hours after discharge. NOTE: at this time ONLY 2 ADULTS may accompany you   One person encouraged to stay at hospital entire time if outpatient surgery    Enter the MAIN entrance located on [address] and report to the surgical desk on the LEFT side of the lobby. Please park in the parking garage or there is free [employer] available after 7am for your use. Bring your insurance card & photo ID with you to register. Bring your medication list with you with dose and frequency listed (including over the counter medications)  Contact your ordering physician/surgeon for medication instructions as soon as possible, especially if taking blood thinners, aspirin, heart, or diabetic medication. Bariatric surgical patients need to call your surgeon if on diabetic medications (as some may need to be stopped 1-week preop)  A Pre-Surgical History and Physical MUST be completed WITHIN 30 DAYS OR LESS prior to your procedure by your Physician or an Urgent Care. DO NOT EAT ANYTHING 8 hours prior to arrival for surgery. You may have sips of WATER ONLY (up to 8 ounces) 4 hours prior to your arrival for surgery. Then nothing further 4 hours prior to arriving at hospital.   NOTE: ALL Gastric, Bariatric & Bowel surgery patients - you MUST follow your surgeon's instructions regarding eating/drinking as you will have very specific instructions to follow. If you did not receive these, call your surgeon's office immediately. No gum, candy, mints, or ice chips day of procedure.    Please refrain from drinking alcohol the day before or day of your
Patient stated that he is hard of hearing in the left ear.
Physical Therapy  Facility/Department: [facility]  Physical Therapy Treatment    Name: Tristin Sarkar  : 1985  MRN: [id number]  Date of Service: 2023    Discharge Recommendations:    Tristin Sarkar scored a 18/24 on the AM-PAC short mobility form. Current research shows that an AM-PAC score of 18 or greater is typically associated with a discharge to the patient's home setting. Based on the patient's AM-PAC score and their current functional mobility deficits, it is recommended that the patient have 2-3 sessions per week of Physical Therapy at d/c to increase the patient's independence. At this time, this patient demonstrates the endurance and safety to discharge home with 24hr A (home vs OP services) and a follow up treatment frequency of 2-3x/wk. Please see assessment section for further patient specific details. If patient discharges prior to next session this note will serve as a discharge summary. Please see below for the latest assessment towards goals. PT Equipment Recommendations  Equipment Needed: No      Patient Diagnosis(es): The primary encounter diagnosis was S/P craniotomy. A diagnosis of Acoustic neuroma [facility]) was also pertinent to this visit. Past Medical History:  has a past medical history of Acoustic neuroma ([facility] [address] Allergic rhinitis, BCC (basal cell carcinoma of skin), Gilbert disease, and Raynaud disease. Past Surgical History:  has a past surgical history that includes Varicocelectomy; Hair transplant; and Neuroma surgery (Left, 2023). Assessment   Body Structures, Functions, Activity Limitations Requiring Skilled Therapeutic Intervention: Decreased functional mobility ; Decreased strength;Decreased safe awareness;Decreased endurance;Decreased balance  Assessment: Pt with improved functional mobility this session. Pt SBA for transfers, amb without AD and stair negotiation with HR. Pt reporting dizziness he had been feeling has resolved.  Pt planning
Physician Progress Note      PATIENT:               Aftab Mittal  CSN #:                  [id number]  :                       1985  ADMIT DATE:       2023 5:40 AM  DISCH DATE:  RESPONDING  PROVIDER #:        Malgorzata Mcmahon CNP          QUERY TEXT:    Patient admitted with vestibular schwannoma. Noted documentation of cerebral   edema in PN . In order to support the diagnosis of cerebral edema, please   include additional clinical indicators in your documentation. Or please   document if the diagnosis of cerebral edema has been ruled out after further   study. The medical record reflects the following:  Risk Factors: 45 yo w/ vestibular schwannoma S/P Craniotomy  Clinical Indicators: Per PN : Cerebral edema:  Treatment: Decadron 4MG taper. Options provided:  -- Cerebral edema present as evidenced by, Please document evidence. -- Cerebral edema was ruled out  -- Other - I will add my own diagnosis  -- Disagree - Not applicable / Not valid  -- Disagree - Clinically unable to determine / Unknown  -- Refer to Clinical Documentation Reviewer    PROVIDER RESPONSE TEXT:    Provider disagreed with this query.   Note updated    Query created by: Nayely Pool on 2023 10:27 AM      Electronically signed by:  Radha Mcmahon CNP 2023 12:08 PM
Place patient label inside box (if no patient label, complete below)  Name:  :    MR#:   Broadus Felty / PROCEDURE  I (we), Ryanne Barton (Patient Name) authorize Daria Nevarez MD, Ken Chapman (Provider / Louanne Kayser) and/or such assistants as may be selected by him/her, to perform the following operation/procedure(s): LEFT RESTROSIGMOID CRANIOITOMY FOR RESECTION OF VESTIBULAR SCHWANOMMA        Note: If unable to obtain consent prior to an emergent procedure, document the emergent reason in the medical record. This procedure has been explained to my (our) satisfaction and included in the explanation was: The intended benefit, nature, and extent of the procedure to be performed; The significant risks involved and the probability of success; Alternative procedures and methods of treatment; The dangers and probable consequences of such alternatives (including no procedure or treatment); The expected consequences of the procedure on my future health; Whether other qualified individuals would be performing important surgical tasks and/or whether  would be present to advise or support the procedure. I (we) understand that there are other risks of infection and other serious complications in the pre-operative/procedural and postoperative/procedural stages of my (our) care. I (we) have asked all of the questions which I (we) thought were important in deciding whether or not to undergo treatment or diagnosis. These questions have been answered to my (our) satisfaction. I (we) understand that no assurance can be given that the procedure will be a success, and no guarantee or warranty of success has been given to me (us).     It has been explained to me (us) that during the course of the operation/procedure, unforeseen conditions may be revealed that necessitate extension of the original procedure(s) or different procedure(s) than those set
Pt admitted into room 4521. A+O x 4.
Pt alert and oriented x4. VSS on RA. Incision to left ear CD&I and LEIA. Cleansed w soap and water and painted w CHG. Pt tolerating PO intake. Voiding adequately via BRP. SBA. PRN oxy given for pain control. All fall precautions in place. Pt to discharge home this afternoon w spouse.
Report called to [address].      Electronically signed by Marline Peña RN on 4/7/2023 at 12:50 AM
Shift assessment completed and charted. Neuro status intact. SB assist to restroom. Complaining of pain in his neck. PRN Anupama given. No other requests at this time. Standard safety measures in place.
Transferred to 320 2035.
TransDERmal, Q72H    [COMPLETED] dexamethasone, 4 mg, Oral, 4 times per day **FOLLOWED BY** dexamethasone, 4 mg, Oral, 3 times per day **FOLLOWED BY** dexamethasone, 4 mg, Oral, 2 times per day **FOLLOWED BY** [START ON 4/9/2023] dexamethasone, 4 mg, Oral, Daily    heparin (porcine), 5,000 Units, SubCUTAneous, 3 times per day    ceFAZolin, 2,000 mg, IntraVENous, Q8H   Infusions    sodium chloride 5 mL/hr at 04/07/23 0911      Antibiotics   Recent Abx Admin                     ceFAZolin (ANCEF) 2,000 mg in sodium chloride 0.9 % 50 mL IVPB (mini-bag) (mg) 2,000 mg New Bag 04/07/23 0914     2,000 mg New Bag  0112     2,000 mg New Bag 04/06/23 1717                     Neurologic Exam:  Mental status: awake and alert and oriented x4    Cranial Nerves:  II: Visual acuity not tested, visual fields full, denies new visual changes / diplopia  III, IV, VI: PERRL, 3 mm bilaterally, EOMI, no nystagmus noted  V: Facial sensation intact bilaterally to touch  VII: Face symmetric  VIII: Hearing intact R ear to spoken voice  IX: Palate movement equal bilaterally  XI: Shoulder shrug equal bilaterally  XII: Tongue midline      Musculoskeletal:   Gait: Not tested   Tone: normal  Sensory: intact to all extremities  Motor strength:    Right  Left    Right  Left    Deltoid  5 5  Hip Flex  5 5   Biceps  5 5  Knee Extensors  5 5   Triceps  5 5  Knee Flexors  5 5   Wrist Ext  5 5  Ankle Dorsiflex. 5 5   Wrist Flex  5 5  Ankle Plantarflex. 5 5   Handgrip  5 5  Ext Acosta Longus  5 5   Thumb Ext  5 5         Incision: intact, clean and dry      Respiratory:  Unlabored respiratory pattern    Abdomen:   Soft, ND   Last BMpreop    Cardiovascular:  Warm, well perfused    Assessment   Patient is a 43 yo M s/p Procedure(s) (LRB):  LEFT RESTROSIGMOID CRANIOTOMY FOR RESECTION OF VESTIBULAR SCHWANOMMA (Left) per Dr. Vonda Yarbrough . Pt educated from [employer] to Neurosurgery\" book pg  25-27, 33-38.     Plan:  Patient improved significantly
(04/06/23 1152)  AM-PAC Inpatient T-Scale Score : 43.63 (04/06/23 1152)  Mobility Inpatient CMS 0-100% Score: 46.58 (04/06/23 1152)  Mobility Inpatient CMS G-Code Modifier : CK (04/06/23 1152)          Tinneti Score       Goals  Short Term Goals  Time Frame for Short Term Goals: by dc  Short Term Goal 1: pt will perform bed mobility mod I  Short Term Goal 2: pt will perform functional transfers mod I  Short Term Goal 3: pt will ambulate 500' with mod I  Short Term Goal 4: pt will negotiate 12 steps with unilateral rail and mod I  Patient Goals   Patient Goals : to return to [facility]       Education  Patient Education  Education Given To: Patient; Family  Education Provided: Role of Therapy;Plan of Care;Precautions  Education Method: Demonstration;Verbal  Barriers to Learning: None  Education Outcome: Verbalized understanding      Therapy Time   Individual Concurrent Group Co-treatment   Time In 0835         Time Out 0913         Minutes 38              Timed Code Treatment Minutes:  23 min     Total Treatment Minutes:  45 min       Manuel Dwyer, PT

## 2023-04-11 LAB
Lab: NORMAL
REPORT: NORMAL
THIS TEST SENT TO: NORMAL

## 2023-04-17 NOTE — OP NOTE
operating room, general anesthesia was induced and the patient was intubated. The patient was placed in the lateral decubitus position, left side facing upward, with a small gel roll under the right axilla. The head was fixed in a Garcia crown-of-thorns, vertex was lower slightly toward the floor and the head placed in a neutral position. A curvilinear incision was planned over the asterion using anatomic landmarks to localize the transverse sinus and digastric groove. A time out was completed, the site was prepped and draped in the usual sterile fashion. A Leung catheter and arterial line were placed. Neuro-monitoring for CN V, VII, IX, X, SSEP, Facial MEP and MEP with Prasb probe stimualtion was initiated by Evokes and baseline potentials recorded. Intravenous antibiotics and Mannitol were given by anesthesia. The arterial pCO2 was maintained at <30, 10 mg IVP decadron was administered. The planned incision was infiltrated with 1% lidocaine and incised full thickness with a #10 blade. Bovie electrocautery was utilized to open the periosteal layer. The flap was elevated using a periosteal elevator and Bovie electrocautery. The asterion was localized and the burrhole performed just inferior and posterior to expose the transverse-sigmoid junction and posterior fossa dura. The midas love was then used to create a craniotomy flap over the right cerebellar hemisphere. The bone dust was collected to create autograft bone dalton for later use, this was mixed with Cipro The flap was elevated with a #1 Penfield and the underlying dura was intact. The [place] Sayres was again used to drill laterally over the mastoid air cells, creating a far lateral exposure. A #11 blade was used to open the dura along the inferior edge of the transverse sinus, extending along the posterior edge of the sigmoid sinus. The [facility] dental was used to extend the durotomy and then [employer] in an L-shaped fashion.  The dura was reflected, a 4-0

## 2023-04-17 NOTE — H&P
I saw and examined Gilbertananda Carranza on 4/5/2023. I have reviewed the pre-operative history and physical and discussed the surgical procedure including the risks. There has been no change to the history or physical in the interval time since consent. They wish to proceed with the planned procedure.      Tami Dobbs MD, PhD  Chahal Gee  Director, [address], [facility] W Elyssa Dean (c), [phone] (o)

## 2023-07-27 DIAGNOSIS — J30.2 SEASONAL ALLERGIES: ICD-10-CM

## 2023-07-28 RX ORDER — FLUTICASONE PROPIONATE 50 MCG
SPRAY, SUSPENSION (ML) NASAL
Qty: 16 G | Refills: 5 | Status: SHIPPED | OUTPATIENT
Start: 2023-07-28

## 2023-10-11 ENCOUNTER — HOSPITAL ENCOUNTER (OUTPATIENT)
Dept: MRI IMAGING | Age: 38
Discharge: HOME | End: 2023-10-11
Attending: NEUROLOGICAL SURGERY
Payer: COMMERCIAL

## 2023-10-11 DIAGNOSIS — D33.3 ACOUSTIC NEUROMA (HCC): ICD-10-CM

## 2023-10-11 PROCEDURE — 70553 MRI BRAIN STEM W/O & W/DYE: CPT

## 2023-10-11 PROCEDURE — 6360000004 HC RX CONTRAST MEDICATION: Performed by: NEUROLOGICAL SURGERY

## 2023-10-11 PROCEDURE — A9576 INJ PROHANCE MULTIPACK: HCPCS | Performed by: NEUROLOGICAL SURGERY

## 2023-10-11 RX ADMIN — GADOTERIDOL 20 ML: 279.3 INJECTION, SOLUTION INTRAVENOUS at 12:40

## 2023-11-01 ENCOUNTER — TELEPHONE (OUTPATIENT)
Dept: FAMILY MEDICINE CLINIC | Age: 38
End: 2023-11-01

## 2023-11-01 NOTE — TELEPHONE ENCOUNTER
Pt called stating that he is in extreme pain after having brain surgery. His headaches are getting worse and worse by the day and he is asking to be seen this week if possible. Please call him back.

## 2023-11-06 ENCOUNTER — OFFICE VISIT (OUTPATIENT)
Dept: FAMILY MEDICINE CLINIC | Age: 38
End: 2023-11-06
Payer: COMMERCIAL

## 2023-11-06 VITALS
TEMPERATURE: 97.6 F | WEIGHT: 210.14 LBS | OXYGEN SATURATION: 97 % | HEART RATE: 72 BPM | DIASTOLIC BLOOD PRESSURE: 70 MMHG | SYSTOLIC BLOOD PRESSURE: 118 MMHG | BODY MASS INDEX: 25.58 KG/M2 | RESPIRATION RATE: 14 BRPM

## 2023-11-06 DIAGNOSIS — G43.709 CHRONIC MIGRAINE WITHOUT AURA WITHOUT STATUS MIGRAINOSUS, NOT INTRACTABLE: ICD-10-CM

## 2023-11-06 DIAGNOSIS — Z98.890 S/P CRANIOTOMY: ICD-10-CM

## 2023-11-06 DIAGNOSIS — L64.9 MALE PATTERN ALOPECIA: ICD-10-CM

## 2023-11-06 DIAGNOSIS — E80.4 GILBERT DISEASE: ICD-10-CM

## 2023-11-06 DIAGNOSIS — D33.3 ACOUSTIC NEUROMA (HCC): Primary | ICD-10-CM

## 2023-11-06 DIAGNOSIS — F90.9 ATTENTION DEFICIT HYPERACTIVITY DISORDER (ADHD), UNSPECIFIED ADHD TYPE: ICD-10-CM

## 2023-11-06 DIAGNOSIS — Z79.899 HIGH RISK MEDICATION USE: ICD-10-CM

## 2023-11-06 DIAGNOSIS — F41.9 ANXIETY: ICD-10-CM

## 2023-11-06 PROCEDURE — 99214 OFFICE O/P EST MOD 30 MIN: CPT | Performed by: FAMILY MEDICINE

## 2023-11-06 RX ORDER — TOPIRAMATE 50 MG/1
50 TABLET, FILM COATED ORAL 2 TIMES DAILY
COMMUNITY
Start: 2023-10-29

## 2023-11-06 RX ORDER — HYDROXYZINE HYDROCHLORIDE 25 MG/1
12.5-25 TABLET, FILM COATED ORAL EVERY 8 HOURS PRN
Qty: 30 TABLET | Refills: 0 | Status: SHIPPED | OUTPATIENT
Start: 2023-11-06

## 2023-11-06 RX ORDER — RIZATRIPTAN BENZOATE 10 MG/1
10 TABLET ORAL PRN
COMMUNITY
Start: 2023-10-29

## 2023-11-06 RX ORDER — TIZANIDINE 4 MG
4 KIT MISCELLANEOUS NIGHTLY
COMMUNITY
Start: 2023-10-29

## 2023-11-27 ENCOUNTER — OFFICE VISIT (OUTPATIENT)
Dept: FAMILY MEDICINE CLINIC | Age: 38
End: 2023-11-27
Payer: COMMERCIAL

## 2023-11-27 VITALS
HEART RATE: 82 BPM | RESPIRATION RATE: 18 BRPM | OXYGEN SATURATION: 99 % | BODY MASS INDEX: 25.68 KG/M2 | TEMPERATURE: 98.1 F | DIASTOLIC BLOOD PRESSURE: 64 MMHG | SYSTOLIC BLOOD PRESSURE: 104 MMHG | WEIGHT: 211 LBS

## 2023-11-27 DIAGNOSIS — G43.709 CHRONIC MIGRAINE WITHOUT AURA WITHOUT STATUS MIGRAINOSUS, NOT INTRACTABLE: ICD-10-CM

## 2023-11-27 DIAGNOSIS — G89.29 CHRONIC NECK PAIN: ICD-10-CM

## 2023-11-27 DIAGNOSIS — M54.2 CHRONIC NECK PAIN: ICD-10-CM

## 2023-11-27 DIAGNOSIS — Z98.890 S/P CRANIOTOMY: ICD-10-CM

## 2023-11-27 DIAGNOSIS — F90.9 ATTENTION DEFICIT HYPERACTIVITY DISORDER (ADHD), UNSPECIFIED ADHD TYPE: ICD-10-CM

## 2023-11-27 DIAGNOSIS — D33.3 ACOUSTIC NEUROMA (HCC): Primary | ICD-10-CM

## 2023-11-27 DIAGNOSIS — G44.209 TENSION HEADACHE: ICD-10-CM

## 2023-11-27 PROCEDURE — 99214 OFFICE O/P EST MOD 30 MIN: CPT | Performed by: FAMILY MEDICINE

## 2023-11-27 RX ORDER — AMITRIPTYLINE HYDROCHLORIDE 25 MG/1
25 TABLET, FILM COATED ORAL NIGHTLY
Qty: 30 TABLET | Refills: 5 | Status: SHIPPED | OUTPATIENT
Start: 2023-11-27

## 2023-12-13 ENCOUNTER — HOSPITAL ENCOUNTER (OUTPATIENT)
Dept: MRI IMAGING | Age: 38
Discharge: HOME | End: 2023-12-13
Attending: NEUROLOGICAL SURGERY
Payer: COMMERCIAL

## 2023-12-13 DIAGNOSIS — D33.3 BENIGN NEOPLASM OF CRANIAL NERVE (HCC): ICD-10-CM

## 2023-12-13 PROCEDURE — 72141 MRI NECK SPINE W/O DYE: CPT

## 2023-12-13 PROCEDURE — 70544 MR ANGIOGRAPHY HEAD W/O DYE: CPT

## 2023-12-13 PROCEDURE — 70547 MR ANGIOGRAPHY NECK W/O DYE: CPT

## 2023-12-27 ENCOUNTER — HOSPITAL ENCOUNTER (OUTPATIENT)
Dept: MRI IMAGING | Age: 38
Discharge: HOME | End: 2023-12-27
Attending: NEUROLOGICAL SURGERY
Payer: COMMERCIAL

## 2023-12-27 DIAGNOSIS — D33.3 ACOUSTIC NEUROMA (HCC): ICD-10-CM

## 2023-12-27 PROCEDURE — 72148 MRI LUMBAR SPINE W/O DYE: CPT

## 2023-12-27 PROCEDURE — 72146 MRI CHEST SPINE W/O DYE: CPT

## 2024-01-09 ENCOUNTER — TELEPHONE (OUTPATIENT)
Dept: DERMATOLOGY | Age: 39
End: 2024-01-09

## 2024-01-09 NOTE — TELEPHONE ENCOUNTER
Pt calling needing to r/s his 1/11 appt due to having had brain surgery wants to get back in sooner than  waiting until June pls return call back @ [phone] to discuss

## 2024-01-16 ENCOUNTER — OFFICE VISIT (OUTPATIENT)
Dept: DERMATOLOGY | Age: 39
End: 2024-01-16
Payer: COMMERCIAL

## 2024-01-16 DIAGNOSIS — D22.9 MULTIPLE NEVI: Primary | ICD-10-CM

## 2024-01-16 DIAGNOSIS — L70.9 ADULT ACNE: ICD-10-CM

## 2024-01-16 DIAGNOSIS — Z85.828 HISTORY OF BASAL CELL CARCINOMA: ICD-10-CM

## 2024-01-16 PROCEDURE — 99213 OFFICE O/P EST LOW 20 MIN: CPT | Performed by: DERMATOLOGY

## 2024-01-16 RX ORDER — GABAPENTIN 100 MG/1
300 CAPSULE ORAL 3 TIMES DAILY
COMMUNITY
Start: 2024-01-08

## 2024-01-16 RX ORDER — ADAPALENE AND BENZOYL PEROXIDE 3; 25 MG/G; MG/G
GEL TOPICAL
Qty: 45 G | Refills: 2 | Status: SHIPPED | OUTPATIENT
Start: 2024-01-16

## 2024-01-16 NOTE — PROGRESS NOTES
[facility]  Darius Gonsalves MD  [phone]      Mauri Dent  1985    38 y.o. male     Date of Visit: 2024    Chief Complaint: skin moles, acne    History of Present Illness:    1.  He presents today for evaluation of multiple moles - not aware of any changes in size, color or shape.       2.  He has chronic adult acne.  Reports excellent control with Epiduo Forte gel used few times per week.     3.  He has a history of a superficial basal cell carcinoma on the posterior aspect the right lower leg-treated with curettage on 2018.     Wears sunscreen regularly.   Paternal grandfather had melanoma ( from it in his 40s).     Son George born 4 months ago.      Interval past medical history:  Had resection of an acoustic neuroma.  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  Having issues with occipital headaches and neck pain.      Review of Systems:  Gen: Feels well, good sense of health.    Past Medical History, Family History, Surgical History, Medications and Allergies reviewed.    Past Medical History:   Diagnosis Date    Acoustic neuroma (HCC)     Allergic rhinitis     BCC (basal cell carcinoma of skin) 2018    right calf    Gilbert disease     Raynaud disease      Past Surgical History:   Procedure Laterality Date    HAIR TRANSPLANT      NEUROMA SURGERY Left 2023    LEFT RESTROSIGMOID CRANIOTOMY FOR RESECTION OF VESTIBULAR SCHWANOMMA performed by Niko Chaidez MD at [facility] OR    VARICOCELECTOMY         Allergies   Allergen Reactions    Amoxicillin Rash     Outpatient Medications Marked as Taking for the 24 encounter (Office Visit) with Darius Gonsalves MD   Medication Sig Dispense Refill    gabapentin (NEURONTIN) 100 MG capsule Take 3 capsules by mouth 3 times daily.      amitriptyline (ELAVIL) 25 MG tablet Take 1 tablet by mouth nightly 30 tablet 5    tiZANidine-Liniment (TIZANIDINE COMFORT PAC) 4 MG MISC Take 4 mg by mouth nightly      rizatriptan

## 2024-02-26 RX ORDER — FINASTERIDE 1 MG/1
1 TABLET, FILM COATED ORAL DAILY
Qty: 90 TABLET | Refills: 3 | Status: SHIPPED | OUTPATIENT
Start: 2024-02-26

## 2024-04-03 ENCOUNTER — HOSPITAL ENCOUNTER (OUTPATIENT)
Dept: MRI IMAGING | Age: 39
Discharge: HOME | End: 2024-04-03
Attending: NEUROLOGICAL SURGERY
Payer: COMMERCIAL

## 2024-04-03 DIAGNOSIS — D36.10 SCHWANNOMA: ICD-10-CM

## 2024-04-03 PROCEDURE — 70553 MRI BRAIN STEM W/O & W/DYE: CPT

## 2024-04-03 PROCEDURE — 6360000004 HC RX CONTRAST MEDICATION: Performed by: NEUROLOGICAL SURGERY

## 2024-04-03 PROCEDURE — A9579 GAD-BASE MR CONTRAST NOS,1ML: HCPCS | Performed by: NEUROLOGICAL SURGERY

## 2024-04-03 RX ADMIN — GADOTERIDOL 20 ML: 279.3 INJECTION, SOLUTION INTRAVENOUS at 15:16

## 2024-06-15 DIAGNOSIS — J30.2 SEASONAL ALLERGIES: ICD-10-CM

## 2024-06-17 RX ORDER — FLUTICASONE PROPIONATE 50 MCG
SPRAY, SUSPENSION (ML) NASAL
Qty: 16 G | Refills: 5 | Status: SHIPPED | OUTPATIENT
Start: 2024-06-17

## 2024-06-25 ENCOUNTER — PATIENT MESSAGE (OUTPATIENT)
Dept: FAMILY MEDICINE CLINIC | Age: 39
End: 2024-06-25

## 2024-06-25 DIAGNOSIS — G43.709 CHRONIC MIGRAINE WITHOUT AURA WITHOUT STATUS MIGRAINOSUS, NOT INTRACTABLE: Primary | ICD-10-CM

## 2024-06-25 DIAGNOSIS — D33.3 ACOUSTIC NEUROMA (HCC): ICD-10-CM

## 2024-07-01 NOTE — TELEPHONE ENCOUNTER
From: Mauri Dent  To: Dr. Lm Stevens  Sent: 6/25/2024 10:53 AM EDT  Subject: Checking In / Bloodwork    Clint Amato - vishal to see you at the [employer] event. I'm still navigating chronic nerve/muscle pain and headaches, but finally seem to be improving and creating momentum.     I'd like to get some bloodwork done as I'm still concerned about my liver health given heavy daily Tylenol usage since surgery (down from ~4k mg to ~2.5kmg per day as of late) in April 2023.     Below is a list of my previous tests alongside most recent date. In addition to liver health, I'd like to check Cholesterol and Testosterone. What other tests do you recommend based on meds I've been taking, previous results, post-op, etc?     For what it's worth, folks have suggested I also check levels for magnesium, vitamin K2, vitamin B1, and vitamin B3.     Please order whatever you think is best when you're able.    Thanks!   Mauri    Bloodwork:  CK - 11/16/23  CMP - 11/16/23  CBC - 11/16/23  Lipid Panel - 3/10/23  Hemoglobin A1C - 3/10/23  TSH - 3/10/23  APTT - 3/10/23  PROTIME-INR - 3/10/23  Testosterone - 5/4/22  C-Reactive Protein - 5/4/22  CC Peptide AB/IGG - 5/4/22  Rheumatoid Factor - 5/4/22  Sedimentation Rate - 5/4/22  Vitamin B12 & Folate - 5/4/22  Vitamin D 25 Hydroxy - 5/4/22

## 2024-07-01 NOTE — TELEPHONE ENCOUNTER
Now I’m seeing the order details in MyChart. Is there a reason we’re not checking CK given all of my muscle pain issues? Thanks again.

## 2025-03-12 RX ORDER — FINASTERIDE 1 MG/1
1 TABLET, FILM COATED ORAL DAILY
Qty: 90 TABLET | Refills: 0 | Status: SHIPPED | OUTPATIENT
Start: 2025-03-12

## 2025-04-07 ENCOUNTER — TRANSCRIBE ORDERS (OUTPATIENT)
Dept: ADMINISTRATIVE | Age: 40
End: 2025-04-07

## 2025-04-07 DIAGNOSIS — D33.3 ACOUSTIC NEUROMA SYNDROME (HCC): Primary | ICD-10-CM

## 2025-04-17 ENCOUNTER — HOSPITAL ENCOUNTER (OUTPATIENT)
Dept: MRI IMAGING | Age: 40
Discharge: HOME | End: 2025-04-17
Attending: NEUROLOGICAL SURGERY
Payer: COMMERCIAL

## 2025-04-17 DIAGNOSIS — D33.3 ACOUSTIC NEUROMA SYNDROME (HCC): ICD-10-CM

## 2025-04-17 PROCEDURE — 6360000004 HC RX CONTRAST MEDICATION: Performed by: NEUROLOGICAL SURGERY

## 2025-04-17 PROCEDURE — A9579 GAD-BASE MR CONTRAST NOS,1ML: HCPCS | Performed by: NEUROLOGICAL SURGERY

## 2025-04-17 PROCEDURE — 70553 MRI BRAIN STEM W/O & W/DYE: CPT

## 2025-04-17 RX ADMIN — GADOTERIDOL 20 ML: 279.3 INJECTION, SOLUTION INTRAVENOUS at 14:54

## 2025-06-10 RX ORDER — FINASTERIDE 1 MG/1
1 TABLET, FILM COATED ORAL DAILY
Qty: 90 TABLET | Refills: 0 | Status: SHIPPED | OUTPATIENT
Start: 2025-06-10

## 2025-06-20 DIAGNOSIS — J30.2 SEASONAL ALLERGIES: ICD-10-CM

## 2025-06-20 RX ORDER — FLUTICASONE PROPIONATE 50 MCG
SPRAY, SUSPENSION (ML) NASAL
Qty: 16 G | Refills: 5 | OUTPATIENT
Start: 2025-06-20

## 2025-07-21 ENCOUNTER — OFFICE VISIT (OUTPATIENT)
Dept: ENT CLINIC | Age: 40
End: 2025-07-21
Payer: COMMERCIAL

## 2025-07-21 ENCOUNTER — PROCEDURE VISIT (OUTPATIENT)
Dept: AUDIOLOGY | Age: 40
End: 2025-07-21
Payer: COMMERCIAL

## 2025-07-21 VITALS
HEART RATE: 71 BPM | TEMPERATURE: 97.7 F | BODY MASS INDEX: 26.55 KG/M2 | SYSTOLIC BLOOD PRESSURE: 129 MMHG | WEIGHT: 218 LBS | HEIGHT: 76 IN | DIASTOLIC BLOOD PRESSURE: 85 MMHG

## 2025-07-21 DIAGNOSIS — H93.8X1 SENSATION OF FULLNESS IN RIGHT EAR: ICD-10-CM

## 2025-07-21 DIAGNOSIS — D33.3 LEFT-SIDED ACOUSTIC NEUROMA (HCC): ICD-10-CM

## 2025-07-21 DIAGNOSIS — H90.3 SENSORINEURAL HEARING LOSS, BILATERAL: Primary | ICD-10-CM

## 2025-07-21 DIAGNOSIS — H90.42 SENSORINEURAL HEARING LOSS (SNHL) OF LEFT EAR WITH UNRESTRICTED HEARING OF RIGHT EAR: Primary | ICD-10-CM

## 2025-07-21 DIAGNOSIS — J30.2 SEASONAL ALLERGIES: ICD-10-CM

## 2025-07-21 PROCEDURE — 92557 COMPREHENSIVE HEARING TEST: CPT

## 2025-07-21 PROCEDURE — 99214 OFFICE O/P EST MOD 30 MIN: CPT | Performed by: OTOLARYNGOLOGY

## 2025-07-21 PROCEDURE — 92567 TYMPANOMETRY: CPT

## 2025-07-21 RX ORDER — DULOXETIN HYDROCHLORIDE 60 MG/1
60 CAPSULE, DELAYED RELEASE ORAL DAILY
COMMUNITY
Start: 2024-09-23

## 2025-07-21 RX ORDER — FLUTICASONE PROPIONATE 50 MCG
1 SPRAY, SUSPENSION (ML) NASAL DAILY
Qty: 16 G | Refills: 5 | Status: SHIPPED | OUTPATIENT
Start: 2025-07-21

## 2025-07-21 RX ORDER — DEXTROAMPHETAMINE SACCHARATE, AMPHETAMINE ASPARTATE, DEXTROAMPHETAMINE SULFATE AND AMPHETAMINE SULFATE 2.5; 2.5; 2.5; 2.5 MG/1; MG/1; MG/1; MG/1
TABLET ORAL
COMMUNITY

## 2025-07-21 ASSESSMENT — ENCOUNTER SYMPTOMS
VOICE CHANGE: 0
FACIAL SWELLING: 0
SINUS PAIN: 0
EYE ITCHING: 0
SORE THROAT: 0
EYE REDNESS: 0
SHORTNESS OF BREATH: 0
TROUBLE SWALLOWING: 0
CHOKING: 0
DIARRHEA: 0
COUGH: 0
SINUS PRESSURE: 0
EYE PAIN: 0

## 2025-07-21 NOTE — PROGRESS NOTES
Subjective:      Patient ID: Mauri Dent is a 39 y.o. male.    HPI  Hearing Loss HPI  CC: hearing loss    General: Mauri is a(n) 39 y.o. male who presents with complete hearing loss left ear since acoustic neuroma surgery. Here mostly to discuss preservation of right hearing. Had new audiogram. Reviewed Tinnitus:No  Otorrhea:No  Vertigo:No  Prior ear surgery: No  Ear trauma: Yes  History of hearing loss: Yes    Needs refill on fluticasone for allergies.       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 performed by Niko Chaidez MD at [facility] OR    VARICOCELECTOMY       Family History   Problem Relation Age of Onset    Cancer Paternal Grandfather         melanoma     Social History     Socioeconomic History    Marital status:      Spouse name: Not on file    Number of children: Not on file    Years of education: Not on file    Highest education level: Not on file   Occupational History    Not on file   Tobacco Use    Smoking status: Former     Types: Cigars    Smokeless tobacco: Never   Substance and Sexual Activity    Alcohol use: Yes     Comment: socially    Drug use: Not on file    Sexual activity: Not on file   Other Topics Concern    Not on file   Social History Narrative    Not on file     Social Drivers of Health     Financial Resource Strain: Low Risk  (3/13/2023)    Overall Financial Resource Strain (CARDIA)     Difficulty of Paying Living Expenses: Not hard at all   Food Insecurity: No Transportation Needs (9/17/2024)    Received from  [facility],  [facility],  [facility]    Yearly

## 2025-07-21 NOTE — PROGRESS NOTES
Mauri Dent   1985, 39 y.o. male   [id number]       Referring Provider: Ti Deshpande MD, PhD  Referral Type: In an order in Epic    Reason for Visit: Evaluation of suspected change in hearing, tinnitus, or balance.    ADULT AUDIOLOGIC EVALUATION      Mauri Dent is a 39 y.o. male seen today, 7/21/2025 , for a recheck audiologic evaluation.  Patient was seen by Ti Deshpande MD, PhD following today's evaluation.    AUDIOLOGIC AND OTHER PERTINENT MEDICAL HISTORY:      Mauri Dent reports concern for right sided ear pressure and \"ratting\" that is newer to him. He has a history of left acoustic neuroma with retrosigmoid removal in 2023. He states he has no hearing now in his left ear. He would like to obtain a baseline audiogram to help preserve hearing in his right ear. Admits to some recreational noise exposure while going to clubs.    He denied otalgia, otorrhea, tinnitus, dizziness, and family history of hearing loss    Date: 7/21/2025     IMPRESSIONS:      Today's results revealed asymmetric sensorineural hearing loss, left ear worse than the right. Excellent speech understanding when in quiet in the right, no response in the left. Tympanometry indicates normal middle ear function in the right, hyper compliant tympanic membrane/eardrum in the left. Discussed test results and implications with patient. Discussed benefits of amplification pending medical clearance.  Patient scheduled to return for hearing aid evaluation to discuss BiCR[employer] amplification trial.  Follow medical recommendations of Ti Deshpande MD, PhD.     ASSESSMENT AND FINDINGS:     Otoscopy unremarkable.    RIGHT EAR:  Hearing Sensitivity:Normal sloping to mild sensorineural hearing loss at 2000 Hz rising to normal hearing.  Speech Recognition Threshold: 15 dB HL  Word Recognition: Excellent 100%, based on NU-6 by difficulty 10-word list at 55 dBHL using recorded speech stimuli.    Tympanometry: Normal peak pressure and compliance,

## (undated) DEVICE — 3.0MM COARSE DIAMOND, EXTENDED

## (undated) DEVICE — SPONGE,LAP,18"X18",DLX,XR,ST,5/PK,40/PK: Brand: MEDLINE

## (undated) DEVICE — SOLUTION IV 1000ML 0.9% SOD CHL

## (undated) DEVICE — COVER XR CASS W21XL40IN UNIV ADH MICROSHIELD

## (undated) DEVICE — TOOL MR8-F2/7TA23 MR8 F2/7CM TAPER 2.3MM: Brand: MIDAS REX MR8

## (undated) DEVICE — IRRIGATION SUCTION CASSETTE: Brand: SONOPET IQ

## (undated) DEVICE — PROBE 8225101 5PK STD PRASS FL TIP ROHS

## (undated) DEVICE — HOOK RETRCT 12MM S STL BLNT E STAY LONE STAR

## (undated) DEVICE — DRAIN SURG 10FR END PERF 1/8IN SIL RND SMOOTH HEAT POLISHED

## (undated) DEVICE — AGENT HEMSTAT W2XL14IN OXIDIZED REGENERATED CELOS ABSRB FOR

## (undated) DEVICE — 3M™ TEGADERM™ TRANSPARENT FILM DRESSING FRAME STYLE, 1624W, 2-3/8 IN X 2-3/4 IN (6 CM X 7 CM), 100/CT 4CT/CASE: Brand: 3M™ TEGADERM™

## (undated) DEVICE — BANDAGE,GAUZE,BULKEE II,4.5"X4.1YD,STRL: Brand: MEDLINE

## (undated) DEVICE — GLOVE SURG SZ 65 CRM LTX FREE POLYISOPRENE POLYMER BEAD ANTI

## (undated) DEVICE — SEALANT SURG 13 YR DURA AUTOSPRAY ADHERUS NUS106] SURGICAL ONE]

## (undated) DEVICE — APPLICATOR MEDICATED 10.5 CC SOLUTION HI LT ORNG CHLORAPREP

## (undated) DEVICE — 1.5MM FINE DIAMOND, EXTENDED

## (undated) DEVICE — NEURO SPONGES: Brand: DEROYAL

## (undated) DEVICE — SUTURE VCRL SZ 2-0 L18IN ABSRB UD CT-1 L36MM 1/2 CIR J839D

## (undated) DEVICE — BLADE CLIPPER SURG SENSICLIP

## (undated) DEVICE — ADAPTER LAB INTMED LUER 17GA

## (undated) DEVICE — TOWEL,STOP FLAG GOLD N-W: Brand: MEDLINE

## (undated) DEVICE — COTTON BALLS, DOUBLE STRUNG: Brand: DEROYAL

## (undated) DEVICE — GLOVE SURG SZ 6 L12IN FNGR THK75MIL WHT LTX POLYMER BEAD

## (undated) DEVICE — SSC BONE WAX: Brand: SSC BONE WAX

## (undated) DEVICE — CODMAN® SURGICAL PATTIES 1/4" X 1/4" (0.64CM X 0.64CM): Brand: CODMAN®

## (undated) DEVICE — GLOVE SURG SZ 75 L12IN FNGR THK94MIL TRNSLUC YEL LTX

## (undated) DEVICE — WIPE INSTR W73XL73CM VISITEC

## (undated) DEVICE — SOLUTION IV 500ML 0.9% SOD CHL PH 5 INJ USP VIAFLX PLAS

## (undated) DEVICE — CRANI: Brand: MEDLINE INDUSTRIES, INC.

## (undated) DEVICE — SUTURE NRLN SZ 4-0 L18IN NONABSORBABLE BLK L13MM TF 1/2 CIR C584D

## (undated) DEVICE — TOBRA BONE BASKET- AUTOGRAFT BONE COLLECTION SYSTEM WITH MESH FILTER AND GRAFT COMPRESSOR: Brand: TOBRA BONE BASKET

## (undated) DEVICE — 7CM ELITE IRRIGATION SLEEVE

## (undated) DEVICE — UNDERGLOVE SURG SZ 8 BLU LTX FREE SYN POLYISOPRENE POLYMER

## (undated) DEVICE — CODMAN® SURGICAL PATTIES 3/4" X 3/4" (1.91CM X 1.91CM): Brand: CODMAN®

## (undated) DEVICE — AGENT HEMSTAT W2XL4IN OXIDIZED REGENERATED CELOS ABSRB SFT

## (undated) DEVICE — GLOVE SURG SZ 65 L12IN FNGR THK79MIL GRN LTX FREE

## (undated) DEVICE — DRESSING GERM DIA1IN CNTR H DIA7MM BLU CHG ANTIMIC PROTCT

## (undated) DEVICE — 2.0MM COARSE DIAMOND, EXTENDED

## (undated) DEVICE — COVER LT HNDL CAM BLU DISP W/ SURG CTRL

## (undated) DEVICE — SPHERES FOR BRAINLAB

## (undated) DEVICE — TOOL MR8-9BA50 MR8 9CM BALL 5MM: Brand: MIDAS REX MR8